# Patient Record
Sex: FEMALE | Race: WHITE | HISPANIC OR LATINO | Employment: UNEMPLOYED | URBAN - METROPOLITAN AREA
[De-identification: names, ages, dates, MRNs, and addresses within clinical notes are randomized per-mention and may not be internally consistent; named-entity substitution may affect disease eponyms.]

---

## 2018-06-30 ENCOUNTER — NURSE TRIAGE (OUTPATIENT)
Dept: ADMINISTRATIVE | Facility: CLINIC | Age: 33
End: 2018-06-30

## 2018-06-30 ENCOUNTER — OFFICE VISIT (OUTPATIENT)
Dept: INTERNAL MEDICINE | Facility: CLINIC | Age: 33
End: 2018-06-30
Payer: COMMERCIAL

## 2018-06-30 VITALS
HEART RATE: 70 BPM | SYSTOLIC BLOOD PRESSURE: 128 MMHG | DIASTOLIC BLOOD PRESSURE: 76 MMHG | WEIGHT: 121.94 LBS | BODY MASS INDEX: 21.61 KG/M2 | TEMPERATURE: 99 F | OXYGEN SATURATION: 99 % | HEIGHT: 63 IN

## 2018-06-30 DIAGNOSIS — F41.9 ANXIETY: ICD-10-CM

## 2018-06-30 DIAGNOSIS — S16.1XXA STRAIN OF NECK MUSCLE, INITIAL ENCOUNTER: ICD-10-CM

## 2018-06-30 DIAGNOSIS — F32.A DEPRESSION, UNSPECIFIED DEPRESSION TYPE: Primary | ICD-10-CM

## 2018-06-30 PROCEDURE — 99203 OFFICE O/P NEW LOW 30 MIN: CPT | Mod: S$GLB,,, | Performed by: INTERNAL MEDICINE

## 2018-06-30 PROCEDURE — 99999 PR PBB SHADOW E&M-EST. PATIENT-LVL III: CPT | Mod: PBBFAC,,, | Performed by: INTERNAL MEDICINE

## 2018-06-30 PROCEDURE — 3008F BODY MASS INDEX DOCD: CPT | Mod: CPTII,S$GLB,, | Performed by: INTERNAL MEDICINE

## 2018-06-30 RX ORDER — CITALOPRAM 20 MG/1
20 TABLET, FILM COATED ORAL DAILY
Qty: 90 TABLET | Refills: 0 | Status: SHIPPED | OUTPATIENT
Start: 2018-06-30 | End: 2018-08-13

## 2018-06-30 RX ORDER — ESCITALOPRAM OXALATE 10 MG/1
10 TABLET ORAL DAILY
Qty: 30 TABLET | Refills: 1 | Status: SHIPPED | OUTPATIENT
Start: 2018-06-30 | End: 2018-06-30

## 2018-06-30 RX ORDER — NAPROXEN 500 MG/1
500 TABLET ORAL 2 TIMES DAILY PRN
Qty: 30 TABLET | Refills: 1 | Status: SHIPPED | OUTPATIENT
Start: 2018-06-30 | End: 2019-07-02

## 2018-06-30 NOTE — TELEPHONE ENCOUNTER
Patient called to report the following:     -patient cannot afford Lexapro  -patient would like to change medication due to cost  -denies urges to harm self       -Provider notified via pc, Dr.Laura Bray    -orders noted for the following   -Citalopram 20mg 1 tab daily dispense # 90 called to [facility]   -no refills     Education completed per [facility] On Call Care Advice including Patient verbalized understating.     Reason for Disposition   Caller has URGENT medication question about med that PCP prescribed and triager unable to answer question    Protocols used: ST MEDICATION QUESTION CALL-A-

## 2018-06-30 NOTE — PATIENT INSTRUCTIONS
Please take one half tablet of Lexapro by mouth daily for 7 days.  Then you may increase to one whole tablet by mouth daily.      Naproxen and naproxen sodium oral immediate-release tablets  What is this medicine?  NAPROXEN (na PROX en) is a non-steroidal anti-inflammatory drug (NSAID). It is used to reduce swelling and to treat pain. This medicine may be used for dental pain, headache, or painful monthly periods. It is also used for painful joint and muscular problems such as arthritis, tendinitis, bursitis, and gout.  How should I use this medicine?  Take this medicine by mouth with a glass of water. Follow the directions on the prescription label. Take it with food if your stomach gets upset. Try to not lie down for at least 10 minutes after you take it. Take your medicine at regular intervals. Do not take your medicine more often than directed. Long-term, continuous use may increase the risk of heart attack or stroke.  A special MedGuide will be given to you by the pharmacist with each prescription and refill. Be sure to read this information carefully each time.  Talk to your pediatrician regarding the use of this medicine in children. Special care may be needed.  What side effects may I notice from receiving this medicine?  Side effects that you should report to your doctor or health care professional as soon as possible:  · black or bloody stools, blood in the urine or vomit  · blurred vision  · chest pain  · difficulty breathing or wheezing  · nausea or vomiting  · severe stomach pain  · skin rash, skin redness, blistering or peeling skin, hives, or itching  · slurred speech or weakness on one side of the body  · swelling of eyelids, throat, lips  · unexplained weight gain or swelling  · unusually weak or tired  · yellowing of eyes or skin  Side effects that usually do not require medical attention (report to your doctor or health care professional if they continue or are  bothersome):  · constipation  · headache  · heartburn  What may interact with this medicine?  · alcohol  · aspirin  · cidofovir  · diuretics  · lithium  · methotrexate  · other drugs for inflammation like ketorolac or prednisone  · pemetrexed  · probenecid  · warfarin  What if I miss a dose?  If you miss a dose, take it as soon as you can. If it is almost time for your next dose, take only that dose. Do not take double or extra doses.  Where should I keep my medicine?  Keep out of the reach of children.  Store at room temperature between 15 and 30 degrees C (59 and 86 degrees F). Keep container tightly closed. Throw away any unused medicine after the expiration date.  What should I tell my health care provider before I take this medicine?  They need to know if you have any of these conditions:  · asthma  · cigarette smoker  · drink more than 3 alcohol containing drinks a day  · heart disease or circulation problems such as heart failure or leg edema (fluid retention)  · high blood pressure  · kidney disease  · liver disease  · stomach bleeding or ulcers  · an unusual or allergic reaction to naproxen, aspirin, other NSAIDs, other medicines, foods, dyes, or preservatives  · pregnant or trying to get pregnant  · breast-feeding  What should I watch for while using this medicine?  Tell your doctor or health care professional if your pain does not get better. Talk to your doctor before taking another medicine for pain. Do not treat yourself.  This medicine does not prevent heart attack or stroke. In fact, this medicine may increase the chance of a heart attack or stroke. The chance may increase with longer use of this medicine and in people who have heart disease. If you take aspirin to prevent heart attack or stroke, talk with your doctor or health care professional.  Do not take other medicines that contain aspirin, ibuprofen, or naproxen with this medicine. Side effects such as stomach upset, nausea, or ulcers may be  more likely to occur. Many medicines available without a prescription should not be taken with this medicine.  This medicine can cause ulcers and bleeding in the stomach and intestines at any time during treatment. Do not smoke cigarettes or drink alcohol. These increase irritation to your stomach and can make it more susceptible to damage from this medicine. Ulcers and bleeding can happen without warning symptoms and can cause death.  You may get drowsy or dizzy. Do not drive, use machinery, or do anything that needs mental alertness until you know how this medicine affects you. Do not stand or sit up quickly, especially if you are an older patient. This reduces the risk of dizzy or fainting spells.  This medicine can cause you to bleed more easily. Try to avoid damage to your teeth and gums when you brush or floss your teeth.  NOTE:This sheet is a summary. It may not cover all possible information. If you have questions about this medicine, talk to your doctor, pharmacist, or health care provider. Copyright© 2017 [employer]        Escitalopram tablets  What is this medicine?  ESCITALOPRAM (es sye BRAXTON oh pram) is used to treat depression and certain types of anxiety.  How should I use this medicine?  Take this medicine by mouth with a glass of water. Follow the directions on the prescription label. You can take it with or without food. If it upsets your stomach, take it with food. Take your medicine at regular intervals. Do not take it more often than directed. Do not stop taking this medicine suddenly except upon the advice of your doctor. Stopping this medicine too quickly may cause serious side effects or your condition may worsen.  A special MedGuide will be given to you by the pharmacist with each prescription and refill. Be sure to read this information carefully each time.  Talk to your pediatrician regarding the use of this medicine in children. Special care may be needed.  What side effects may I notice  from receiving this medicine?  Side effects that you should report to your doctor or health care professional as soon as possible:  · allergic reactions like skin rash, itching or hives, swelling of the face, lips, or tongue  · confusion  · feeling faint or lightheaded, falls  · fast talking and excited feelings or actions that are out of control  · hallucination, loss of contact with reality  · seizures  · suicidal thoughts or other mood changes  · unusual bleeding or bruising  Side effects that usually do not require medical attention (report to your doctor or health care professional if they continue or are bothersome):  · blurred vision  · changes in appetite  · change in sex drive or performance  · headache  · increased sweating  · nausea  What may interact with this medicine?  Do not take this medicine with any of the following medications:  · certain medicines for fungal infections like fluconazole, itraconazole, ketoconazole, posaconazole, voriconazole  · cisapride  · citalopram  · dofetilide  · dronedarone  · linezolid  · MAOIs like Carbex, Eldepryl, Marplan, Nardil, and Parnate  · methylene blue (injected into a vein)  · pimozide  · thioridazine  · ziprasidone  This medicine may also interact with the following medications:  · alcohol  · aspirin and aspirin-like medicines  · carbamazepine  · certain medicines for depression, anxiety, or psychotic disturbances  · certain medicines for migraine headache like almotriptan, eletriptan, frovatriptan, naratriptan, rizatriptan, sumatriptan, zolmitriptan  · certain medicines for sleep  · certain medicines that treat or prevent blood clots like warfarin, enoxaparin, dalteparin  · cimetidine  · diuretics  · fentanyl  · furazolidone  · isoniazid  · lithium  · metoprolol  · NSAIDs, medicines for pain and inflammation, like ibuprofen or naproxen  · other medicines that prolong the QT interval (cause an abnormal heart rhythm)  · procarbazine  · rasagiline  · supplements  like Charisse's wort, kava kava, valerian  · tramadol  · tryptophan  What if I miss a dose?  If you miss a dose, take it as soon as you can. If it is almost time for your next dose, take only that dose. Do not take double or extra doses.  Where should I keep my medicine?  Keep out of reach of children.  Store at room temperature between 15 and 30 degrees C (59 and 86 degrees F). Throw away any unused medicine after the expiration date.  What should I tell my health care provider before I take this medicine?  They need to know if you have any of these conditions:  · bipolar disorder or a family history of bipolar disorder  · diabetes  · glaucoma  · heart disease  · kidney or liver disease  · receiving electroconvulsive therapy  · seizures (convulsions)  · suicidal thoughts, plans, or attempt by you or a family member  · an unusual or allergic reaction to escitalopram, the related drug citalopram, other medicines, foods, dyes, or preservatives  · pregnant or trying to become pregnant  · breast-feeding  What should I watch for while using this medicine?  Tell your doctor if your symptoms do not get better or if they get worse. Visit your doctor or health care professional for regular checks on your progress. Because it may take several weeks to see the full effects of this medicine, it is important to continue your treatment as prescribed by your doctor.  Patients and their families should watch out for new or worsening thoughts of suicide or depression. Also watch out for sudden changes in feelings such as feeling anxious, agitated, panicky, irritable, hostile, aggressive, impulsive, severely restless, overly excited and hyperactive, or not being able to sleep. If this happens, especially at the beginning of treatment or after a change in dose, call your health care professional.  You may get drowsy or dizzy. Do not drive, use machinery, or do anything that needs mental alertness until you know how this medicine  affects you. Do not stand or sit up quickly, especially if you are an older patient. This reduces the risk of dizzy or fainting spells. Alcohol may interfere with the effect of this medicine. Avoid alcoholic drinks.  Your mouth may get dry. Chewing sugarless gum or sucking hard candy, and drinking plenty of water may help. Contact your doctor if the problem does not go away or is severe.  NOTE:This sheet is a summary. It may not cover all possible information. If you have questions about this medicine, talk to your doctor, pharmacist, or health care provider. Copyright© 2017 [employer]

## 2018-06-30 NOTE — PROGRESS NOTES
"Subjective:       Patient ID: Deirdre Prado is a 33 y.o. female.    Chief Complaint: Headache (symptoms started about a week ago); Nausea; and Fatigue    HPI Mrs. Prado is a 33 year old female who presents with multiple chief complaints today including headache, fatigue, nausea, and arm pain.    The patient states that she has had some headache on the right side of her head.  She also has pain down the right side of her neck and into the right shoulder.  She has a tingling or numbness sensation of the right arm.  She also has numbness in one of her toes.  She has had a recent cramping of her fingers.  She says that she cannot sleep.  She has not slept in about 7 days.  Her 3-year-old keeps her up at night, but she does not think this is the issue.  She feels hungry but when she tries to eat something, she feels like she needs to vomit and she will feel dizzy.  She states that she "doesn't want to do anything" around the house.  The relationship with her  and her stepdaughter has been strained.  Her stepdaughter is 18 years old and lives in the home with the patient does not speak to the patient.  She has not spoken to her in 2 years.  She states that there is a lot of stress in the home.  She feels anxious and depressed, but denies any suicidal ideation.  She has been taking Tylenol and Advil migraine for relief of her pain symptoms, but these are not helping.  She has a history of postpartum depression, but states that this was never treated.  When her  was told of the diagnosis of postpartum depression, he laughed and said that this did not exist.  She has never been treated for anxiety or depression in the past.  She has recently lost about 15 pounds but states that this was  intentional.    Review of Systems   Constitutional: Negative for unexpected weight change.   Gastrointestinal: Positive for nausea. Negative for vomiting.   Musculoskeletal: Positive for arthralgias, myalgias and neck " pain.   Neurological: Positive for dizziness.   Psychiatric/Behavioral: Positive for dysphoric mood and sleep disturbance. Negative for suicidal ideas. The patient is nervous/anxious.        Objective:      Physical Exam   Constitutional: She is oriented to person, place, and time. She appears well-developed and well-nourished. No distress.   HENT:   Head: Normocephalic and atraumatic.   Right Ear: External ear normal.   Left Ear: External ear normal.   Nose: Nose normal.   Eyes: Conjunctivae and EOM are normal.   Neck:   Some tightness noted of right side cervical muscles   Cardiovascular: Normal rate, regular rhythm, normal heart sounds and intact distal pulses.  Exam reveals no gallop and no friction rub.    No murmur heard.  Pulmonary/Chest: Effort normal and breath sounds normal. No respiratory distress. She has no wheezes. She has no rales.   Neurological: She is alert and oriented to person, place, and time.   Skin: Skin is warm and dry. She is not diaphoretic.   Psychiatric: Her speech is normal and behavior is normal. Judgment and thought content normal. Cognition and memory are normal. She exhibits a depressed mood.   Tearful at times   Vitals reviewed.      Assessment:       1. Depression, unspecified depression type    2. Anxiety    3. Strain of neck muscle, initial encounter        Plan:     #1 anxiety and depression  Starting patient on Lexapro 10 mg by mouth daily.  Patient will take half a tablet by mouth daily ×7 days, and then she will increase to one whole tablet daily.  Recommend counseling in conjunction with the medication.  Return to clinic in 6 weeks for follow-up    #3 right sided cervical muscle strain  Naproxen 500 mg by mouth twice a day  Apply heat to the site as needed for pain relief.  Recommend massage and continue movement of the site.    RTC 6 weeks

## 2018-08-13 ENCOUNTER — OFFICE VISIT (OUTPATIENT)
Dept: INTERNAL MEDICINE | Facility: CLINIC | Age: 33
End: 2018-08-13
Payer: COMMERCIAL

## 2018-08-13 VITALS
WEIGHT: 128.06 LBS | TEMPERATURE: 99 F | DIASTOLIC BLOOD PRESSURE: 72 MMHG | BODY MASS INDEX: 22.69 KG/M2 | SYSTOLIC BLOOD PRESSURE: 110 MMHG | HEART RATE: 80 BPM | HEIGHT: 63 IN | OXYGEN SATURATION: 98 %

## 2018-08-13 DIAGNOSIS — F41.9 ANXIETY: ICD-10-CM

## 2018-08-13 DIAGNOSIS — F32.A DEPRESSION, UNSPECIFIED DEPRESSION TYPE: Primary | ICD-10-CM

## 2018-08-13 DIAGNOSIS — G43.709 CHRONIC MIGRAINE WITHOUT AURA WITHOUT STATUS MIGRAINOSUS, NOT INTRACTABLE: ICD-10-CM

## 2018-08-13 PROCEDURE — 3008F BODY MASS INDEX DOCD: CPT | Mod: CPTII,S$GLB,, | Performed by: INTERNAL MEDICINE

## 2018-08-13 PROCEDURE — 99999 PR PBB SHADOW E&M-EST. PATIENT-LVL III: CPT | Mod: PBBFAC,,, | Performed by: INTERNAL MEDICINE

## 2018-08-13 PROCEDURE — 99213 OFFICE O/P EST LOW 20 MIN: CPT | Mod: S$GLB,,, | Performed by: INTERNAL MEDICINE

## 2018-08-13 RX ORDER — BUPROPION HYDROCHLORIDE 100 MG/1
100 TABLET ORAL 2 TIMES DAILY
Qty: 60 TABLET | Refills: 3 | Status: SHIPPED | OUTPATIENT
Start: 2018-08-13 | End: 2019-07-02

## 2018-08-13 NOTE — PROGRESS NOTES
Subjective:       Patient ID: Deirdre Prado is a 33 y.o. female.    Chief Complaint: Depression and Anxiety    HPI Mrs. Prado is a 33 year old female with anxiety and depression who presents for follow-up of anxiety and depression.  Patient states that her feelings of anxiety and depression are much improved.  She denies any suicidal ideation as well.  However she reports that if she does not take the medicine that she will have a headache.  She already has a history of migraines and reports that this is worsening her migraines.  She will take the medication as she is supposed to every day, but she continued to have a headache for couple of hours afterwards.  She has also noticed a decrease in her sexual feelings and this is causing difficulty with her marriage.  She is currently on Celexa.  The Lexapro that was initially prescribed was too expensive and was changed to Celexa.  She has also noticed that she sleeps a lot on the current medication.    Review of Systems   Neurological: Positive for headaches.   Psychiatric/Behavioral: Positive for dysphoric mood (improved). Negative for suicidal ideas. The patient is nervous/anxious (improved).        Objective:      Physical Exam   Constitutional: She is oriented to person, place, and time. She appears well-developed and well-nourished. No distress.   HENT:   Head: Normocephalic and atraumatic.   Eyes: Conjunctivae and EOM are normal.   Neurological: She is alert and oriented to person, place, and time.   Skin: Skin is warm and dry. She is not diaphoretic.   Psychiatric: She has a normal mood and affect. Her behavior is normal. Judgment and thought content normal.   Vitals reviewed.      Assessment:       1. Depression, unspecified depression type    2. Anxiety    3. Chronic migraine without aura without status migrainosus, not intractable        Plan:     1.  Anxiety and depression  GAD7 score 3; PHQ9 score 5 (will be scanned into the record)  Changing patient  from Celexa to Wellbutrin in an effort to try to avoid some of the side effects that she has mentioned above.  Patient counseled to contact me for any side effects or worsening symptoms.  Return to clinic in 2 months for follow-up    2. Chronic migraine  Instructed patient that if she is still having headaches despite changing to Wellbutrin, she should follow up with her neurologist.    RTC 2 months

## 2018-10-12 ENCOUNTER — TELEPHONE (OUTPATIENT)
Dept: INTERNAL MEDICINE | Facility: CLINIC | Age: 33
End: 2018-10-12

## 2018-11-08 ENCOUNTER — OFFICE VISIT (OUTPATIENT)
Dept: INTERNAL MEDICINE | Facility: CLINIC | Age: 33
End: 2018-11-08
Payer: COMMERCIAL

## 2018-11-08 VITALS
HEIGHT: 63 IN | WEIGHT: 128.75 LBS | TEMPERATURE: 99 F | BODY MASS INDEX: 22.81 KG/M2 | OXYGEN SATURATION: 99 % | DIASTOLIC BLOOD PRESSURE: 68 MMHG | SYSTOLIC BLOOD PRESSURE: 102 MMHG | HEART RATE: 89 BPM

## 2018-11-08 DIAGNOSIS — B37.31 YEAST INFECTION OF THE VAGINA: Primary | ICD-10-CM

## 2018-11-08 PROCEDURE — 99214 OFFICE O/P EST MOD 30 MIN: CPT | Mod: S$GLB,,, | Performed by: INTERNAL MEDICINE

## 2018-11-08 PROCEDURE — 99999 PR PBB SHADOW E&M-EST. PATIENT-LVL III: CPT | Mod: PBBFAC,,, | Performed by: INTERNAL MEDICINE

## 2018-11-08 PROCEDURE — 3008F BODY MASS INDEX DOCD: CPT | Mod: CPTII,S$GLB,, | Performed by: INTERNAL MEDICINE

## 2018-11-08 RX ORDER — ASPIRIN 325 MG
1 TABLET, DELAYED RELEASE (ENTERIC COATED) ORAL NIGHTLY
Qty: 7 TUBE | Refills: 1 | Status: SHIPPED | OUTPATIENT
Start: 2018-11-08 | End: 2018-11-15

## 2018-11-08 RX ORDER — FLUCONAZOLE 150 MG/1
150 TABLET ORAL DAILY
Qty: 1 TABLET | Refills: 0 | Status: SHIPPED | OUTPATIENT
Start: 2018-11-08 | End: 2018-11-09

## 2018-11-08 NOTE — PATIENT INSTRUCTIONS
Clotrimazole vaginal cream  What is this medicine?  CLOTRIMAZOLE (kloe TRIM a zole) is an antifungal medicine. It is used to treat yeast infections of the vagina.  How should I use this medicine?  This medicine is for use in the vagina. It may also be applied to the external areas of skin around the vagina to decrease itching and discomfort. Do not take by mouth. Wash hands before and after use. Read package directions carefully before using. Do not use your medicine more often than directed. Do not stop using this medicine except on your doctor's advice.  Talk to your pediatrician regarding the use of this medicine in children. While this medicine may be used in girls as young as 12 years for selected conditions, precautions do apply.  What side effects may I notice from receiving this medicine?  Side effects that you should report to your doctor or health care professional as soon as possible:  · allergic reactions like skin rash, itching or hives, swelling of the face, lips, or tongue  · pain or trouble passing urine  · vaginal pain  Side effects that usually do not require medical attention (report to your doctor or health care professional if they continue or are bothersome):  · vaginal irritation, itching or burning  What may interact with this medicine?  · spermicides  Do not use any other vaginal products without telling your doctor or health care professional.  What if I miss a dose?  If you miss a dose, use it as soon as you can. If it is almost time for your next dose, use only that dose. Do not use double or extra doses.  Where should I keep my medicine?  Keep out of the reach of children.  Store at room temperature below 30 degrees C (86 degrees F). Do not freeze. Throw away any unused medicine after the expiration date.  What should I tell my health care provider before I take this medicine?  They need to know if you have any of these conditions:  · diabetes  · frequent infections  · HIV or  AIDS  · an unusual or allergic reaction to clotrimazole, other medicines, foods, dyes or preservatives  · pregnant or trying to get pregnant  · breast-feeding  What should I watch for while using this medicine?  Tell your doctor or health care professional if your symptoms do not start to get better within a few days.  It is better not to have sex until you have finished your treatment. This medicine may damage condoms or diaphragms and cause them not to work properly. It may also decrease the effect of vaginal spermicides. Do not rely on any of these methods to prevent sexually transmitted diseases or pregnancy while you are using this medicine.  Vaginal medicines usually will come out of the vagina during treatment. To keep the medicine from getting on your clothing, wear a mini-pad or sanitary napkin. The use of tampons is not recommended since they may soak up the medicine. To help clear up the infection, wear freshly washed cotton, not synthetic, underwear.  NOTE:This sheet is a summary. It may not cover all possible information. If you have questions about this medicine, talk to your doctor, pharmacist, or health care provider. Copyright© 2017 [employer]        Fluconazole tablets  What is this medicine?  FLUCONAZOLE (floo TANYA na zole) is an antifungal medicine. It is used to treat certain kinds of fungal or yeast infections.  How should I use this medicine?  Take this medicine by mouth. Follow the directions on the prescription label. Do not take your medicine more often than directed.  Talk to your pediatrician regarding the use of this medicine in children. Special care may be needed. This medicine has been used in children as young as 6 months of age.  What side effects may I notice from receiving this medicine?  Side effects that you should report to your doctor or health care professional as soon as possible:  · allergic reactions like skin rash or itching, hives, swelling of the lips, mouth, tongue,  or throat  · dark urine  · feeling dizzy or faint  · irregular heartbeat or chest pain  · redness, blistering, peeling or loosening of the skin, including inside the mouth  · trouble breathing  · unusual bruising or bleeding  · vomiting  · yellowing of the eyes or skin  Side effects that usually do not require medical attention (report to your doctor or health care professional if they continue or are bothersome):  · changes in how food tastes  · diarrhea  · headache  · stomach upset or nausea  What may interact with this medicine?  Do not take this medicine with any of the following medications:  · astemizole  · certain medicines for irregular heart beat like dofetilide, dronedarone, quinidine  · cisapride  · erythromycin  · lomitapide  · other medicines that prolong the QT interval (cause an abnormal heart rhythm)  · pimozide  · terfenadine  · thioridazine  · tolvaptan  · ziprasidone  This medicine may also interact with the following medications:  · antiviral medicines for HIV or AIDS  · birth control pills  · certain antibiotics like rifabutin, rifampin  · certain medicines for blood pressure like amlodipine, isradipine, felodipine, hydrochlorothiazide, losartan, nifedipine  · certain medicines for cancer like cyclophosphamide, vinblastine, vincristine  · certain medicines for cholesterol like atorvastatin, lovastatin, fluvastatin, simvastatin  · certain medicines for depression, anxiety, or psychotic disturbances like amitriptyline, midazolam, nortriptyline, triazolam  · certain medicines for diabetes like glipizide, glyburide, tolbutamide  · certain medicines for pain like alfentanil, fentanyl, methadone  · certain medicines for seizures like carbamazepine, phenytoin  · certain medicines that treat or prevent blood clots like warfarin  · halofantrine  · medicines that lower your chance of fighting infection like cyclosporine, prednisone, tacrolimus  · NSAIDS, medicines for pain and inflammation, like  celecoxib, diclofenac, flurbiprofen, ibuprofen, meloxicam, naproxen  · other medicines for fungal infections  · sirolimus  · theophylline  · tofacitinib  What if I miss a dose?  If you miss a dose, take it as soon as you can. If it is almost time for your next dose, take only that dose. Do not take double or extra doses.  Where should I keep my medicine?  Keep out of the reach of children.  Store at room temperature below 30 degrees C (86 degrees F). Throw away any medicine after the expiration date.  What should I tell my health care provider before I take this medicine?  They need to know if you have any of these conditions:  · electrolyte abnormalities  · history of irregular heart beat  · kidney disease  · an unusual or allergic reaction to fluconazole, other azole antifungals, medicines, foods, dyes, or preservatives  · pregnant or trying to get pregnant  · breast-feeding  What should I watch for while using this medicine?  Visit your doctor or health care professional for regular checkups. If you are taking this medicine for a long time you may need blood work. Tell your doctor if your symptoms do not improve. Some fungal infections need many weeks or months of treatment to cure.  Alcohol can increase possible damage to your liver. Avoid alcoholic drinks.  If you have a vaginal infection, do not have sex until you have finished your treatment. You can wear a sanitary napkin. Do not use tampons. Wear freshly washed cotton, not synthetic, panties.  NOTE:This sheet is a summary. It may not cover all possible information. If you have questions about this medicine, talk to your doctor, pharmacist, or health care provider. Copyright© 2017 [employer]

## 2018-11-08 NOTE — PROGRESS NOTES
Subjective:       Patient ID: Deirdre Prado is a 33 y.o. female.    Chief Complaint: Cyst (x4d)    HPI Mrs. Prado is a 33 year old female who presents with a chief complaint of vaginal pain. Patient states that she has a sore or a bump the right side of her vulva.  It has been present for 4 days.  She is unsure if it is getting larger in size but she does note that it is getting worse because it is becoming more painful.  Her pain is currently 8/10 in severity.  She had this before.  Sometime in the last few months.  She used cortisone and Desitin and it got better.  She has had some green drainage from the site.  No associated fevers.  Not currently using any medications on the site. It is constant.     Review of Systems   Constitutional: Negative for fever.   Genitourinary: Positive for vaginal pain.       Objective:      Physical Exam   Constitutional: She is oriented to person, place, and time. She appears well-developed and well-nourished. No distress.   HENT:   Head: Normocephalic and atraumatic.   Eyes: Conjunctivae and EOM are normal.   Genitourinary:         Genitourinary Comments: Linear area of erythema as highlighted on the diagram.  Copious white vaginal discharge noted.  White exudate also noted in labial folds.   Musculoskeletal: She exhibits no edema or deformity.   Neurological: She is alert and oriented to person, place, and time.   Skin: Skin is warm and dry. She is not diaphoretic.   Psychiatric: She has a normal mood and affect. Her behavior is normal. Judgment and thought content normal.   Vitals reviewed.      Assessment:       1. Yeast infection of the vagina        Plan:     1. Yeast infection of vagina  Fluconazole 150 mg POX 1 dose  clotrimazole cream applied nightly x7 days  If no relief with above, consider herpes on differential given the linear erythematous lesion.    RTC PRN

## 2019-07-02 ENCOUNTER — OFFICE VISIT (OUTPATIENT)
Dept: OBSTETRICS AND GYNECOLOGY | Facility: CLINIC | Age: 34
End: 2019-07-02
Payer: COMMERCIAL

## 2019-07-02 VITALS
SYSTOLIC BLOOD PRESSURE: 112 MMHG | HEIGHT: 63 IN | DIASTOLIC BLOOD PRESSURE: 64 MMHG | WEIGHT: 125.25 LBS | BODY MASS INDEX: 22.19 KG/M2

## 2019-07-02 DIAGNOSIS — Z01.419 ENCOUNTER FOR WELL WOMAN EXAM WITH ROUTINE GYNECOLOGICAL EXAM: Primary | ICD-10-CM

## 2019-07-02 DIAGNOSIS — Z12.31 ENCOUNTER FOR SCREENING MAMMOGRAM FOR BREAST CANCER: ICD-10-CM

## 2019-07-02 PROCEDURE — 99385 PREV VISIT NEW AGE 18-39: CPT | Mod: S$GLB,,, | Performed by: OBSTETRICS & GYNECOLOGY

## 2019-07-02 PROCEDURE — 99385 PR PREVENTIVE VISIT,NEW,18-39: ICD-10-PCS | Mod: S$GLB,,, | Performed by: OBSTETRICS & GYNECOLOGY

## 2019-07-02 PROCEDURE — 99999 PR PBB SHADOW E&M-EST. PATIENT-LVL III: CPT | Mod: PBBFAC,,, | Performed by: OBSTETRICS & GYNECOLOGY

## 2019-07-02 PROCEDURE — 88175 CYTOPATH C/V AUTO FLUID REDO: CPT

## 2019-07-02 PROCEDURE — 99999 PR PBB SHADOW E&M-EST. PATIENT-LVL III: ICD-10-PCS | Mod: PBBFAC,,, | Performed by: OBSTETRICS & GYNECOLOGY

## 2019-07-06 NOTE — PROGRESS NOTES
"GYNECOLOGY  :  Deirdre Prado is a 34 y.o.    Here today for  gyn annual visit     No gyn  complaints  Normal menstrual cycles   No Hx Abnormal PAP smears  No Hx Abnormal Mammogram       Past Medical History:   Diagnosis Date    Breast disorder     Mental disorder      Past Surgical History:   Procedure Laterality Date    TUBAL LIGATION       Family History   Problem Relation Age of Onset    Cancer Mother     No Known Problems Father     Breast cancer Maternal Grandmother      Social History     Tobacco Use    Smoking status: Former Smoker    Smokeless tobacco: Never Used   Substance Use Topics    Alcohol use: Yes     Frequency: Never     Binge frequency: Never    Drug use: No     OB History    Para Term  AB Living   2 2 2     2   SAB TAB Ectopic Multiple Live Births           2      # Outcome Date GA Lbr Louie/2nd Weight Sex Delivery Anes PTL Lv   2 Term 14    F Vag-Spont   OLIVIER   1 Term 11    F Vag-Spont   OLIVIER       /64 (BP Location: Right arm)   Ht 5' 3" (1.6 m)   Wt 56.8 kg (125 lb 3.5 oz)   LMP 2019   BMI 22.18 kg/m²     Last PAP= 4- 5 years   LMP =6/3/19   Last Mammogram = n/a  Last Colonoscopy  =n/a      COMPREHENSIVE GYN HISTORY:  G 2  P2     PAP History: Denies abnormal Paps.  Infection History: Denies STDs. Denies PID.  Benign History: Denies uterine fibroids. Denies ovarian cysts. Denies endometriosis.   Cancer History: Denies cervical cancer. Denies uterine cancer or hyperplasia. Denies ovarian cancer. Denies vulvar cancer or pre-cancer. Denies vaginal cancer or pre-cancer. Denies breast cancer. Denies colon cancer.  Sexual Activity History: ( x ) Yes   (  )   no   Menstrual History: Age of menarche: ( 13  )  years. Every (30   )       days, flows for (  4 )   days. moderate  flow.  Dysmenorrhea History:  absent  Contraception:   BTL in 2016     ROS  GENERAL: Denies significant weight gain or weight loss. Feeling well overall.  SKIN: Denies rash " or lesions.  Normal skin turgor  HEAD: Denies head injury or headache.   NODES: Denies enlarged lymph nodes.   CHEST: Denies chest pain or shortness of breath.   CARDIOVASCULAR: Denies palpitations or left sided chest pain.   ABDOMEN: No abdominal pain,no  diarrhea,constipation  nausea, vomiting or rectal bleeding.   URINARY: normal  Frequency,no  Dysuria or burning on urination.   REPRODUCTIVE: Per HPI   BREASTS: The patient sometimes performs breast self-examination and denies pain, lumps, or nipple discharge.   HEMATOLOGIC: No easy bruisability or excessive bleeding.   MUSCULOSKELETAL: Denies joint pain or swelling.   NEUROLOGIC: Denies syncope or weakness.   PSYCHIATRIC: Denies depression, anxiety or mood swings.    Physical Exam     Constitutional: She is oriented to person, place, and time. She appears well-developed and well-nourished. No distress.   HENT:   Head: Normocephalic.   NECK: Neck symmetric without masses or thyromegaly.  Cardiovascular: Normal rate and regular rhythm.   Pulmonary/Chest: Effort normal and breath sounds normal. No respiratory distress. She has no wheezes.   Breasts: Symmetrical, no skin changes or visible lesions. No palpable masses, nipple discharge or adenopathy bilaterally.  Abdominal: Soft not distended. Bowel sounds are normal. She exhibits   no masses . No tenderness to palpation.   Genitourinary: Pelvic exam was performed with patient supine.   External genitalia normal no lesions.Normal hair distribution   Adequate perineal body,normal urethral meatus   Vagina moist and well rugated without lesions, no vaginal  Discharge.   Cervix pink and without lesions. No bleeding. No significant cystocele or rectocele.  Bimanual exam showed uterus normal size, shape and position , mobile and nontender. Adnexa without masses or tenderness. Urethra and bladder normal  Extremities normal no cyanosis ,edema.     Procedures:  Pap smear    A/P Deirdre Prado 34 y.o.     Dx=  1-  routine gyn exam   2- cervical cancer screening   3-    Plan:  Routine gyn   -s/p normal breast exam   -s/p normal pelvic exam:    Patient was counseled today on A.C.S. Pap guidelines and recommendations for yearly pelvic exams, mammograms and monthly self breast exams; to see her PCP for other health maintenance, nutrition and weight gain counseling, discussed lab values.  Discussed colonoscopy recommendations every 10 years starting at age 50   Calcium and vit D daily intake     F/u in 1 yr or MAYLINN    Yayo Zamarripa M.D.   OB/GYN

## 2019-10-09 ENCOUNTER — PATIENT OUTREACH (OUTPATIENT)
Dept: ADMINISTRATIVE | Facility: OTHER | Age: 34
End: 2019-10-09

## 2019-10-10 ENCOUNTER — OFFICE VISIT (OUTPATIENT)
Dept: OBSTETRICS AND GYNECOLOGY | Facility: CLINIC | Age: 34
End: 2019-10-10
Payer: COMMERCIAL

## 2019-10-10 VITALS
HEIGHT: 63 IN | BODY MASS INDEX: 21.99 KG/M2 | WEIGHT: 124.13 LBS | SYSTOLIC BLOOD PRESSURE: 104 MMHG | DIASTOLIC BLOOD PRESSURE: 62 MMHG

## 2019-10-10 DIAGNOSIS — R10.2 PELVIC PAIN: Primary | ICD-10-CM

## 2019-10-10 DIAGNOSIS — N89.8 VAGINAL DISCHARGE: ICD-10-CM

## 2019-10-10 PROCEDURE — 99213 PR OFFICE/OUTPT VISIT, EST, LEVL III, 20-29 MIN: ICD-10-PCS | Mod: S$GLB,,, | Performed by: OBSTETRICS & GYNECOLOGY

## 2019-10-10 PROCEDURE — 87801 DETECT AGNT MULT DNA AMPLI: CPT

## 2019-10-10 PROCEDURE — 87481 CANDIDA DNA AMP PROBE: CPT | Mod: 59

## 2019-10-10 PROCEDURE — 3008F BODY MASS INDEX DOCD: CPT | Mod: CPTII,S$GLB,, | Performed by: OBSTETRICS & GYNECOLOGY

## 2019-10-10 PROCEDURE — 99999 PR PBB SHADOW E&M-EST. PATIENT-LVL III: CPT | Mod: PBBFAC,,, | Performed by: OBSTETRICS & GYNECOLOGY

## 2019-10-10 PROCEDURE — 99999 PR PBB SHADOW E&M-EST. PATIENT-LVL III: ICD-10-PCS | Mod: PBBFAC,,, | Performed by: OBSTETRICS & GYNECOLOGY

## 2019-10-10 PROCEDURE — 99213 OFFICE O/P EST LOW 20 MIN: CPT | Mod: S$GLB,,, | Performed by: OBSTETRICS & GYNECOLOGY

## 2019-10-10 PROCEDURE — 87086 URINE CULTURE/COLONY COUNT: CPT

## 2019-10-10 PROCEDURE — 3008F PR BODY MASS INDEX (BMI) DOCUMENTED: ICD-10-PCS | Mod: CPTII,S$GLB,, | Performed by: OBSTETRICS & GYNECOLOGY

## 2019-10-10 RX ORDER — CIPROFLOXACIN 250 MG/1
250 TABLET, FILM COATED ORAL EVERY 12 HOURS
Qty: 6 TABLET | Refills: 0 | Status: SHIPPED | OUTPATIENT
Start: 2019-10-10 | End: 2019-10-13

## 2019-10-10 NOTE — PROGRESS NOTES
"GYNECOLOGY  :  Deirdre Prado is a 34 y.o.    Here today for  gyn evaluation    3-4 days of suprapubic pain associated with burning and dark urine   Also last month bled  Twice, second episode was dark and with 'odor'   But is concerned mostly about the bleeding   Not bleeding now, sand was only one episode last month       Past Medical History:   Diagnosis Date    Breast disorder     Mental disorder      Past Surgical History:   Procedure Laterality Date    TUBAL LIGATION       Family History   Problem Relation Age of Onset    Cancer Mother     No Known Problems Father     Breast cancer Maternal Grandmother      Social History     Tobacco Use    Smoking status: Former Smoker    Smokeless tobacco: Never Used   Substance Use Topics    Alcohol use: Yes     Frequency: Never     Binge frequency: Never    Drug use: No     OB History    Para Term  AB Living   2 2 2     2   SAB TAB Ectopic Multiple Live Births           2      # Outcome Date GA Lbr Louie/2nd Weight Sex Delivery Anes PTL Lv   2 Term 14    F Vag-Spont   OLIVIER   1 Term 11    F Vag-Spont   OLIVIER       /62 (BP Location: Right arm)   Ht 5' 3" (1.6 m)   Wt 56.3 kg (124 lb 1.9 oz)   LMP 2019   BMI 21.99 kg/m²     ROS  GENERAL: Denies significant weight gain or weight loss. Feeling well overall.  SKIN: Denies rash or lesions.  Normal skin turgor  HEAD: Denies head injury or headache.   NODES: Denies enlarged lymph nodes.   CHEST: Denies chest pain or shortness of breath.   CARDIOVASCULAR: Denies palpitations or left sided chest pain.   ABDOMEN: No abdominal pain,no  diarrhea,constipation  nausea, vomiting or rectal bleeding.   URINARY: normal  Frequency,no  Dysuria or burning on urination.   REPRODUCTIVE: Per HPI   BREASTS: The patient sometimes performs breast self-examination and denies pain, lumps, or nipple discharge.   HEMATOLOGIC: No easy bruisability or excessive bleeding.   MUSCULOSKELETAL: Denies " joint pain or swelling.   NEUROLOGIC: Denies syncope or weakness.   PSYCHIATRIC: Denies depression, anxiety or mood swings.    Physical Exam     Constitutional: She is oriented to person, place, and time. She appears well-developed and well-nourished. No distress.   HENT:   Head: Normocephalic.   NECK: Neck symmetric without masses or thyromegaly.  Cardiovascular: Normal rate and regular rhythm.   Pulmonary/Chest: Effort normal and breath sounds normal. No respiratory distress. She has no wheezes.   Breasts: Symmetrical, no skin changes or visible lesions. No palpable masses, nipple discharge or adenopathy bilaterally.  Abdominal: Soft not distended. Bowel sounds are normal. She exhibits   no masses . Suprapubic  tenderness to palpation.   Genitourinary: Pelvic exam was performed with patient supine.   External genitalia normal no lesions.Normal hair distribution   Adequate perineal body,normal urethral meatus   Vagina moist and well rugated without lesions, no vaginal  Discharge.   Cervix pink and without lesions. No bleeding. No significant cystocele or rectocele.  Bimanual exam showed uterus normal size, shape and position , mobile and nontender. Adnexa without masses or tenderness. Urethra and bladder normal  Extremities normal no cyanosis ,edema.     Procedures:  Urine culture   Urine dip ++ gloria ++ leuk     A/P Deirdre Prado 34 y.o.     Dx=  1- pelvic pain    Dysuria    2- UTI   3-    Plan:  Cipro x UTI  Will review culture   If continues with irregular bleeding will RTC  For TVUS       Patient was counseled today on A.C.S. Pap guidelines and recommendations for yearly pelvic exams, mammograms and monthly self breast exams; to see her PCP for other health maintenance, nutrition and weight gain counseling, discussed lab values.  Discussed colonoscopy recommendations every 10 years starting at age 50   Calcium and vit D daily intake     F/u in 1 yr or PRN    Yayo Zamarripa M.D.   OB/GYN

## 2019-10-11 LAB
BACTERIAL VAGINOSIS DNA: POSITIVE
CANDIDA GLABRATA DNA: NEGATIVE
CANDIDA KRUSEI DNA: NEGATIVE
CANDIDA RRNA VAG QL PROBE: NEGATIVE
T VAGINALIS RRNA GENITAL QL PROBE: NEGATIVE

## 2019-10-12 LAB — BACTERIA UR CULT: NO GROWTH

## 2019-10-15 ENCOUNTER — TELEPHONE (OUTPATIENT)
Dept: OBSTETRICS AND GYNECOLOGY | Facility: CLINIC | Age: 34
End: 2019-10-15

## 2019-10-15 RX ORDER — METRONIDAZOLE 500 MG/1
500 TABLET ORAL EVERY 12 HOURS
Qty: 14 TABLET | Refills: 0 | Status: SHIPPED | OUTPATIENT
Start: 2019-10-15 | End: 2019-10-22

## 2019-10-15 NOTE — TELEPHONE ENCOUNTER
Affirm resulted  Positive for BV  Rx for flagyl sent to pharmacy on file  Please inform patient    Yayo Zamarripa M.D.   OB/GYN

## 2019-10-16 ENCOUNTER — TELEPHONE (OUTPATIENT)
Dept: OBSTETRICS AND GYNECOLOGY | Facility: CLINIC | Age: 34
End: 2019-10-16

## 2019-10-16 NOTE — TELEPHONE ENCOUNTER
Called pt-  answered the call and will inform  Pt to contact us. He states he picked up a medication and pt started taking it already but he will have her call us

## 2019-10-16 NOTE — TELEPHONE ENCOUNTER
----- Message from Michell Cotto sent at 10/16/2019  2:37 PM CDT -----  Contact: [phone]  Patient Returning Your Phone Call

## 2020-03-02 ENCOUNTER — TELEPHONE (OUTPATIENT)
Dept: OBSTETRICS AND GYNECOLOGY | Facility: CLINIC | Age: 35
End: 2020-03-02

## 2020-03-02 NOTE — TELEPHONE ENCOUNTER
----- Message from Rojelio Avenadno sent at 2/29/2020 11:12 AM CST -----  Contact: pt   Pt would like a call back regarding having breast pain          Pt can be reached at  [phone]

## 2020-03-04 ENCOUNTER — PATIENT OUTREACH (OUTPATIENT)
Dept: ADMINISTRATIVE | Facility: OTHER | Age: 35
End: 2020-03-04

## 2020-05-16 ENCOUNTER — HOSPITAL ENCOUNTER (EMERGENCY)
Facility: HOSPITAL | Age: 35
Discharge: HOME | End: 2020-05-16
Attending: EMERGENCY MEDICINE
Payer: COMMERCIAL

## 2020-05-16 VITALS
WEIGHT: 124 LBS | RESPIRATION RATE: 15 BRPM | BODY MASS INDEX: 21.97 KG/M2 | HEART RATE: 72 BPM | OXYGEN SATURATION: 100 % | SYSTOLIC BLOOD PRESSURE: 117 MMHG | DIASTOLIC BLOOD PRESSURE: 78 MMHG | HEIGHT: 63 IN | TEMPERATURE: 98 F

## 2020-05-16 DIAGNOSIS — R07.89 ACUTE CHEST WALL PAIN: Primary | ICD-10-CM

## 2020-05-16 PROCEDURE — 36000 PLACE NEEDLE IN VEIN: CPT

## 2020-05-16 PROCEDURE — 93010 ELECTROCARDIOGRAM REPORT: CPT | Mod: ,,, | Performed by: INTERNAL MEDICINE

## 2020-05-16 PROCEDURE — 99284 EMERGENCY DEPT VISIT MOD MDM: CPT | Mod: 25

## 2020-05-16 PROCEDURE — 93010 EKG 12-LEAD: ICD-10-PCS | Mod: ,,, | Performed by: INTERNAL MEDICINE

## 2020-05-16 PROCEDURE — 93005 ELECTROCARDIOGRAM TRACING: CPT

## 2020-05-16 RX ORDER — ASPIRIN 325 MG
325 TABLET ORAL
Status: DISCONTINUED | OUTPATIENT
Start: 2020-05-16 | End: 2020-05-16

## 2020-05-16 NOTE — ED NOTES
Intermittent left side cp associated with sob and palpitations. Episode lasted for seconds and resolved without intervention.  She denies trauma to her chest. Denies lifting, pushing, and pulling heavy objects. Denies rash of any kind.     Pt is alert, age appropriate and in no acute distress. Respirations are even and unlabored. Bilateral breath sounds are clear throughout chest. abd is soft, not tender and not distended. pt denies change in feeding, bowel or bladder habits. Skin is warm and color is appropriate for ethnicity.  No edema noted to bilateral lower extremities. Pt moves all extremities well. Conjunctivae normal. Pt is dressed appropriately and well groomed.

## 2020-05-16 NOTE — DISCHARGE INSTRUCTIONS
Para el dolor:  [place] Tylenol 650 mg cada 4-6 horas. También tome Aleve 220 mg cada 12 horas O ibuprofeno 600-800 mg cada 6 horas.

## 2020-05-16 NOTE — ED PROVIDER NOTES
Encounter Date: 5/16/2020       History     Chief Complaint   Patient presents with    Chest Pain     pt presents to ED today c/o left arm pain that radiates to left upper chest and palpiations onset yesterday.     Palpitations     The patient is a 35-year-old female.  She denies chronic medical conditions but has a documented history of breast disorder and mental disorder.  She presents with chest pain that began two days ago.  She has intermittent left-sided chest discomfort that she notices with breathing.  She has associated shortness of breath.  These episodes last for a few seconds and resolved without treatment.  She denies like trauma to her chest.  She denies lifting, pushing, and pulling heavy objects.  She denies rashes in lesions to the skin of her chest.  She denies cough.  She has not taking any medications to attempt treatment for this chest discomfort.  She also to reports a period of frequent palpitations that occurred a couple of months ago and have since resolved.  She was having daily episodes in which her heart would race.  She used to smoke cigarettes but stop smoking two months ago when she began to have palpitations.      The history is provided by the patient. No  was used.     Review of patient's allergies indicates:  No Known Allergies  Past Medical History:   Diagnosis Date    Breast disorder     Mental disorder      Past Surgical History:   Procedure Laterality Date    TUBAL LIGATION       Family History   Problem Relation Age of Onset    Cancer Mother     No Known Problems Father     Breast cancer Maternal Grandmother      Social History     Tobacco Use    Smoking status: Former Smoker    Smokeless tobacco: Never Used   Substance Use Topics    Alcohol use: Yes     Frequency: Never     Binge frequency: Never    Drug use: No     Review of Systems   Constitutional: Negative for chills and fever.   HENT: Negative for sore throat and trouble swallowing.     Eyes: Negative for visual disturbance.   Respiratory: Positive for shortness of breath. Negative for cough.    Cardiovascular: Positive for chest pain and palpitations. Negative for leg swelling.   Gastrointestinal: Negative for abdominal pain, nausea and vomiting.       Physical Exam     Initial Vitals [05/16/20 1052]   BP Pulse Resp Temp SpO2   120/75 73 20 98.4 °F (36.9 °C) 100 %      MAP       --         Physical Exam    Nursing note and vitals reviewed.  Constitutional: She appears well-developed and well-nourished. She is not diaphoretic. No distress.   HENT:   Head: Normocephalic and atraumatic.   Mouth/Throat: Oropharynx is clear and moist.   Eyes: Conjunctivae are normal. No scleral icterus.   Neck: No JVD present.   Cardiovascular: Normal rate, regular rhythm and normal heart sounds. Exam reveals no gallop and no friction rub.    No murmur heard.  Pulmonary/Chest: Breath sounds normal. No stridor. No respiratory distress. She has no wheezes. She has no rhonchi. She has no rales. She exhibits tenderness.   Diffuse tenderness to the left chest.   Abdominal: Soft. She exhibits no distension. There is no tenderness.   Musculoskeletal:   No calf swelling or tenderness bilaterally. Negative bilateral Gaurang's sign.   Neurological: She is alert and oriented to person, place, and time. GCS score is 15. GCS eye subscore is 4. GCS verbal subscore is 5. GCS motor subscore is 6.   Skin: Skin is warm and dry. No pallor.         ED Course   Procedures  Labs Reviewed - No data to display  EKG Readings: (Independently Interpreted)   05/16/2020 13:52  Normal sinus rhythm. Ventricular rate 67 bpm.  Normal axis.  Normal QRS and QT intervals.  No ST segment elevation or depression.  Normal T-wave morphology. Overall impression:  Normal EKG.       Imaging Results          X-Ray Chest AP Portable (Final result)  Result time 05/16/20 12:42:31    Final result by Bryant Rutledge MD (05/16/20 12:42:31)                 Impression:       As above.      Electronically signed by: Bryant Rutledge MD  Date:    05/16/2020  Time:    12:42             Narrative:    EXAMINATION:  XR CHEST AP PORTABLE    CLINICAL HISTORY:  Chest Pain;    TECHNIQUE:  Single frontal view of the chest was performed.    COMPARISON:  09/02/2012    FINDINGS:  No consolidation, pleural effusion or pneumothorax.  Cardiomediastinal silhouette is unremarkable.                                 Medical Decision Making:   Independently Interpreted Test(s):   I have ordered and independently interpreted EKG Reading(s) - see prior notes  Clinical Tests:   Lab Tests: Ordered and Reviewed  Radiological Study: Ordered and Reviewed  Medical Tests: Ordered and Reviewed    Medical decision making:  This patient has undergone evaluation for intermittent left-sided chest pain.  She is afebrile with stable vitals.  She is in no respiratory distress.  Her breath sounds are clear.  She has diffuse tenderness to the left chest wall on exam.  EKG is normal.  Chest x-ray is normal.  She is low risk for acute coronary syndromes, PE, aortic dissection, and other more serious etiologies of chest pain.  Feel that this pain is of a musculoskeletal origin.  She has been instructed to attempt treatment with over-the-counter analgesics.  PCP follow-up advised.  She has been instructed to return to the ED for worsened pain, breathing difficulty, or for any other concerns.                                 Clinical Impression:       ICD-10-CM ICD-9-CM   1. Acute chest wall pain R07.89 786.52         Disposition:   Disposition: Discharged  Condition: Stable                        Tirso Whiteside III, MD  05/16/20 [id number]

## 2020-06-12 ENCOUNTER — TELEPHONE (OUTPATIENT)
Dept: OBSTETRICS AND GYNECOLOGY | Facility: CLINIC | Age: 35
End: 2020-06-12

## 2020-06-12 NOTE — TELEPHONE ENCOUNTER
Called patient and left message stating that if she needs to reschedule her appointment with [facility] to call me back.

## 2020-06-18 ENCOUNTER — PATIENT OUTREACH (OUTPATIENT)
Dept: ADMINISTRATIVE | Facility: OTHER | Age: 35
End: 2020-06-18

## 2020-06-27 ENCOUNTER — NURSE TRIAGE (OUTPATIENT)
Dept: ADMINISTRATIVE | Facility: CLINIC | Age: 35
End: 2020-06-27

## 2020-06-27 NOTE — TELEPHONE ENCOUNTER
Feeling sick for the last 3 days.  Some mild sob with sitting.   Discussed that she should be seen today and discussed option of urgent care on [address] which is close by her.  Called urgent care [address] to verify they are open and they say they are open until 6 pm today and are doing covid testing.    Reason for Disposition   MILD difficulty breathing (e.g., minimal/no SOB at rest, SOB with walking, pulse <100)    Additional Information   Negative: SEVERE difficulty breathing (e.g., struggling for each breath, speaks in single words)   Negative: Difficult to awaken or acting confused (e.g., disoriented, slurred speech)   Negative: Bluish (or gray) lips or face now   Negative: Shock suspected (e.g., cold/pale/clammy skin, too weak to stand, low BP, rapid pulse)   Negative: Sounds like a life-threatening emergency to the triager   Negative: [1] COVID-19 exposure AND [2] NO symptoms   Negative: COVID-19 and Breastfeeding, questions about   Negative: [1] Adult with possible COVID-19 symptoms AND [2] triager concerned about severity of symptoms or other causes   Negative: SEVERE or constant chest pain or pressure (Exception: mild central chest pain, present only when coughing)   Negative: MODERATE difficulty breathing (e.g., speaks in phrases, SOB even at rest, pulse 100-120)   Negative: Patient sounds very sick or weak to the triager    Protocols used: CORONAVIRUS (COVID-19) DIAGNOSED OR [id number]

## 2020-06-28 ENCOUNTER — OFFICE VISIT (OUTPATIENT)
Dept: URGENT CARE | Facility: CLINIC | Age: 35
End: 2020-06-28
Payer: COMMERCIAL

## 2020-06-28 VITALS
RESPIRATION RATE: 16 BRPM | OXYGEN SATURATION: 99 % | SYSTOLIC BLOOD PRESSURE: 130 MMHG | WEIGHT: 124 LBS | HEART RATE: 73 BPM | HEIGHT: 63 IN | TEMPERATURE: 98 F | BODY MASS INDEX: 21.97 KG/M2 | DIASTOLIC BLOOD PRESSURE: 90 MMHG

## 2020-06-28 DIAGNOSIS — B34.9 VIRAL SYNDROME: ICD-10-CM

## 2020-06-28 DIAGNOSIS — Z20.822 EXPOSURE TO COVID-19 VIRUS: Primary | ICD-10-CM

## 2020-06-28 PROCEDURE — U0003 INFECTIOUS AGENT DETECTION BY NUCLEIC ACID (DNA OR RNA); SEVERE ACUTE RESPIRATORY SYNDROME CORONAVIRUS 2 (SARS-COV-2) (CORONAVIRUS DISEASE [COVID-19]), AMPLIFIED PROBE TECHNIQUE, MAKING USE OF HIGH THROUGHPUT TECHNOLOGIES AS DESCRIBED BY CMS-2020-01-R: HCPCS

## 2020-06-28 PROCEDURE — 99203 OFFICE O/P NEW LOW 30 MIN: CPT | Mod: S$GLB,,, | Performed by: PHYSICIAN ASSISTANT

## 2020-06-28 PROCEDURE — 99203 PR OFFICE/OUTPT VISIT, NEW, LEVL III, 30-44 MIN: ICD-10-PCS | Mod: S$GLB,,, | Performed by: PHYSICIAN ASSISTANT

## 2020-06-28 NOTE — PROGRESS NOTES
"Subjective:       Patient ID: Deirdre Prado is a 35 y.o. female.    Vitals:  height is 5' 3" (1.6 m) and weight is 56.2 kg (124 lb). Her tympanic temperature is 97.8 °F (36.6 °C). Her blood pressure is 130/90 (abnormal) and her pulse is 73. Her respiration is 16 and oxygen saturation is 99%.     Chief Complaint: Headache    35-year-old female who presents with  for evaluation.  Patient reports a 4 day history of headache, generalized body ache, and chills.  Her symptoms have improved compared to initially.   She has been taking Tylenol for symptoms. She was also exposed to someone who was positive with COVID 1 week ago.  Patient requesting COVID swab.    Patient denies any nuchal rigidity, vision changes, hearing loss, focal weakness or deficits, or seizure activity.  Patient denies any recent URI symptoms,  loss of taste/smell, cough, weakness, fever, sweats,  palpitations, difficulty swallowing, sore throat, swollen glands, chest pain, shortness of breath, dizziness, lightheadedness with position changes, dysuria, hematuria, rectal bleeding, bladder/bowel incontinence, flank pain, abdominal pain,  nausea/vomiting, or diarrhea.        Constitution: Positive for chills. Negative for activity change, appetite change, sweating, fatigue, fever and generalized weakness.   HENT: Negative for ear pain, hearing loss, facial swelling, congestion, postnasal drip, sinus pain, sinus pressure, sore throat, trouble swallowing and voice change.    Neck: Negative for neck pain, neck stiffness and painful lymph nodes.   Cardiovascular: Negative for chest pain, leg swelling, palpitations, sob on exertion and passing out.   Eyes: Negative for eye discharge, eye pain, photophobia, vision loss, double vision and blurred vision.   Respiratory: Negative for chest tightness, cough, sputum production, bloody sputum, COPD, shortness of breath, stridor, wheezing and asthma.    Gastrointestinal: Negative for abdominal pain, nausea, " vomiting, constipation, diarrhea, bright red blood in stool, rectal bleeding, heartburn and bowel incontinence.   Genitourinary: Negative for dysuria, frequency, urgency, urine decreased, flank pain, bladder incontinence, hematuria and history of kidney stones.   Musculoskeletal: Positive for muscle cramps. Negative for trauma, joint pain, joint swelling, abnormal ROM of joint and muscle ache.   Skin: Negative for color change, pale, rash, wound and bruising.   Allergic/Immunologic: Negative for seasonal allergies, asthma and immunocompromised state.   Neurological: Positive for headaches and history of migraines. Negative for dizziness, history of vertigo, light-headedness, passing out, facial drooping, speech difficulty, coordination disturbances, loss of balance, disorientation, altered mental status, loss of consciousness, numbness, tingling and seizures.   Hematologic/Lymphatic: Negative for swollen lymph nodes, easy bruising/bleeding and trouble clotting. Does not bruise/bleed easily.   Psychiatric/Behavioral: Negative for altered mental status, disorientation, nervous/anxious, sleep disturbance and depression. The patient is not nervous/anxious.        Objective:      Physical Exam   Constitutional: She is oriented to person, place, and time. She appears well-developed. She is cooperative.  Non-toxic appearance. She does not appear ill. No distress.   HENT:   Head: Normocephalic and atraumatic.   Right Ear: Hearing, external ear and ear canal normal. No drainage, swelling or tenderness.   Left Ear: Hearing, external ear and ear canal normal. No drainage, swelling or tenderness.   Nose: Nose normal. No rhinorrhea or purulent discharge. Right sinus exhibits no maxillary sinus tenderness and no frontal sinus tenderness. Left sinus exhibits no maxillary sinus tenderness and no frontal sinus tenderness.   Mouth/Throat: Uvula is midline, oropharynx is clear and moist and mucous membranes are normal. Mucous  membranes are moist. No trismus in the jaw. No uvula swelling. No oropharyngeal exudate, posterior oropharyngeal edema or posterior oropharyngeal erythema. Tonsils are 2+ on the right. Tonsils are 2+ on the left. No tonsillar exudate.   Eyes: Pupils are equal, round, and reactive to light. Conjunctivae, EOM and lids are normal. Right eye exhibits no discharge. Left eye exhibits no discharge.   Neck: Normal range of motion and full passive range of motion without pain. Neck supple. No neck rigidity.   Cardiovascular: Normal rate, regular rhythm, normal heart sounds and normal pulses.   Pulmonary/Chest: Effort normal and breath sounds normal. No accessory muscle usage or stridor. No respiratory distress. She has no wheezes. She exhibits no tenderness.   Abdominal: Soft. Normal appearance and bowel sounds are normal. She exhibits no distension and no mass. There is no splenomegaly or hepatomegaly. There is no abdominal tenderness. There is no rebound, no guarding, no tenderness at McBurney's point and negative Bales's sign.   Musculoskeletal: Normal range of motion.      Right lower leg: No edema.      Left lower leg: No edema.      Comments: 5/5 strength and ROM in all extremities.  Gait normal.   Lymphadenopathy:     She has no cervical adenopathy.   Neurological: She is alert and oriented to person, place, and time. She has normal motor skills, normal sensation and intact cranial nerves. She displays no weakness and normal reflexes. No cranial nerve deficit or sensory deficit. She exhibits normal muscle tone. Gait and coordination normal. Coordination normal.   Skin: Skin is warm, dry and not diaphoretic. Capillary refill takes less than 2 seconds.   Psychiatric: Her behavior is normal. Thought content normal.   Nursing note and vitals reviewed.        Assessment:       1. Exposure to Covid-19 Virus    2. Viral syndrome          On exam, patient is nontoxic appearing and vitals are stable.  Patient is essentially  neurovascularly intact on exam.  POCT clinic testing done for COVID-19 nasal swab.  COVID-19 precautions were reiterated.  We will notify patient of the results in the next 24-72 hours; can receive results on Mychart. Patient was recommended OTC treatments for their symptoms.      Patient was instructed to return for re-evaluation for any worsening or change in current symptoms. Strict ED versus clinic precautions given in depth. Discharge and follow-up instructions given verbally/printed with the patient who expressed understanding and willingness to comply with my recommendations.  Patient verbalized understanding and agreed with the entirety of plan of care.      Plan:         Exposure to Covid-19 Virus    Viral syndrome           Patient Instructions     PLEASE READ YOUR DISCHARGE INSTRUCTIONS ENTIRELY AS IT CONTAINS IMPORTANT INFORMATION.    Patient had covid testing done today.  We will notify you of your results in the next 1-3 days. You can also receive the results on my chart. Quarantine at home until you receive results.  Patient understand that they cannot return to work until they are given their results and further recommendations.   If Negative: symptom free without fever reducing meds in 24 hours - can go back to work in 24 hours with surgical mask for 14 days.  If Positive: 3 days since improved symptoms, no fever without fever reducing meds - have to be out for a minimum of 10 days passed from when symptoms first appeared with improvements in respiratory symptoms.    If patient is asymptomatic, patient will still need to be quarantine for at least 10 days from exact known exposure date OR from positive test results date.    Discussed corona virus precautions and reviewed CDC FAC; printed a copy for patient.  I discussed to continue to monitor their symptoms. Discussed that if their symptoms persist or worsen to seek re-evaluation. Clinic vs. ER precautions were given.  Patient verbalized  understanding and agreed with the above plan of care.    - Rest.  Drink plenty of fluids.    - Tylenol as directed as needed for fever/pain.  For Tylenol, do not exceed 4000 mg/ day.       -You must understand that you've received an Urgent Care treatment only and that you may be released before all your medical problems are known or treated. You, the patient, will    arrange for follow up care as instructed. Please arrange follow up with your primary medical clinic within 2-5 days if your signs and symptoms have not resolved or worsen.   - Follow up with your PCP or specialty clinic as directed.  You can call [phone] or [phone] to schedule an appointment with the appropriate provider.    - If your condition worsens or fails to improve we recommend that you receive another evaluation at the emergency room immediately or contact your primary medical clinic to discuss your concerns.                Instructions for Patients Awaiting COVID-19 Test Results    You will either be called with your test result or it will be released to the patient portal.  If you have any questions about your test, please visit [email or web address] or call our COVID-19 information line at [phone].    Prevention steps for patients with confirmed or suspected COVID-19       Stay home and stay away from family members and friends. The CDC says, you can leave home after these three things have happened: 1) You have had no fever for at least 72 hours (that is three full days of no fever without the use of medicine that reduces fevers) 2) AND other symptoms have improved (for example, when your cough or shortness of breath have improved) 3) AND at least 10 days have passed since your symptoms first appeared.   Separate yourself from other people and animals in your home.   Call ahead before visiting your doctor.   Wear a facemask.   Cover your coughs and sneezes.   Wash your hands often with soap and water; hand   can be used, too.   Avoid sharing personal household items.   Wipe down surfaces used daily.   Monitor your symptoms. Seek prompt medical attention if your illness is worsening (e.g., difficulty breathing).    Before seeking care, call your healthcare provider.   If you have a medical emergency and need to call 911, notify the dispatch personnel that you have, or are being evaluated for COVID-19. If possible, put on a facemask before emergency medical services arrive.        Recommended precautions for household members, intimate partners, and caregivers in a home setting of a patient with symptomatic laboratory-confirmed COVID-19 or a patient under investigation.  Household members, intimate partners, and caregivers in the home setting awaiting tests results have close contact with a person with symptomatic, laboratory-confirmed COVID-19 or a person under investigation. Close contacts should monitor their health; they should call their provider right away if they develop symptoms suggestive of COVID-19 (e.g., fever, cough, shortness of breath).    Close contacts should also follow these recommendations:   Make sure that you understand and can help the patient follow their provider's instructions for medication(s) and care. You should help the patient with basic needs in the home and provide support for getting groceries, prescriptions, and other personal needs.   Monitor the patient's symptoms. If the patient is getting sicker, call his or her healthcare provider and tell them that the patient has laboratory-confirmed COVID-19. If the patient has a medical emergency and you need to call 911, notify the dispatch personnel that the patient has, or is being evaluated for COVID-19.   Household members should stay in another room or be  from the patient. Household members should use a separate bedroom and bathroom, if available.   Prohibit visitors.   Household members should care for any  pets in the home.   Make sure that shared spaces in the home have good air flow, such as by an air conditioner or an opened window, weather permitting.   Perform hand hygiene frequently. Wash your hands often with soap and water for at least 20 seconds or use an alcohol-based hand  (that contains > 60% alcohol) covering all surfaces of your hands and rubbing them together until they feel dry. Soap and water should be used preferentially.   Avoid touching your eyes, nose, and mouth.   The patient should wear a facemask. If the patient is not able to wear a facemask (for example, because it causes trouble breathing), caregivers should wear a mask when they are in the same room as the patient.   Wear a disposable facemask and gloves when you touch or have contact with the patient's blood, stool, or body fluids, such as saliva, sputum, nasal mucus, vomit, urine.  o Throw out disposable facemasks and gloves after using them. Do not reuse.  o When removing personal protective equipment, first remove and dispose of gloves. Then, immediately clean your hands with soap and water or alcohol-based hand . Next, remove and dispose of facemask, and immediately clean your hands again with soap and water or alcohol-based hand .   You should not share dishes, drinking glasses, cups, eating utensils, towels, bedding, or other items with the patient. After the patient uses these items, you should wash them thoroughly (see below Wash laundry thoroughly).   Clean all high-touch surfaces, such as counters, tabletops, doorknobs, bathroom fixtures, toilets, phones, keyboards, tablets, and bedside tables, every day. Also, clean any surfaces that may have blood, stool, or body fluids on them.   Use a household cleaning spray or wipe, according to the label instructions. Labels contain instructions for safe and effective use of the cleaning product including precautions you should take when applying the  product, such as wearing gloves and making sure you have good ventilation during use of the product.   Wash laundry thoroughly.  o Immediately remove and wash clothes or bedding that have blood, stool, or body fluids on them.  o Wear disposable gloves while handling soiled items and keep soiled items away from your body. Clean your hands (with soap and water or an alcohol-based hand ) immediately after removing your gloves.  o Read and follow directions on labels of laundry or clothing items and detergent. In general, using a normal laundry detergent according to washing machine instructions and dry thoroughly using the warmest temperatures recommended on the clothing label.   Place all used disposable gloves, facemasks, and other contaminated items in a lined container before disposing of them with other household waste. Clean your hands (with soap and water or an alcohol-based hand ) immediately after handling these items. Soap and water should be used preferentially if hands are visibly dirty.   Discuss any additional questions with your state or local health department or healthcare provider. Check available hours when contacting your local health department.    For more information see CDC link below.      https://www.cdc.gov/coronavirus/2019-ncov/hcp/guidance-prevent-spread.html#precautions        Sources:  [facility], [facility]          Instructions for Home Care of Patients and Caretakers with Coronavirus Disease 2019     Limit visitors to the home.  Older persons and those that have chronic medical conditions such as diabetes, lung and heart disease are at increased risk for illness.    If possible, patients should use a separate bedroom while recovering. Caregivers and household members should avoid prolonged contact with the patient which means to stay 6 feet away and avoid contact with cough droplets.  When close contact is necessary, wash your hands  before and immediately after contact.    Perform hand hygiene frequently. Wash your hands often with soap and water for at least 20 seconds or use an alcohol-based hand , covering all surfaces of your hands and rubbing them together until they feel dry.    Avoid touching your eyes, nose, and mouth with unwashed hands.   Avoid sharing household items with the patient. You should not share dishes, drinking glasses, cups, eating utensils, towels, bedding, or other items. After the patient uses these items, you should wash them thoroughly.   Wash laundry thoroughly.   o Immediately remove and wash clothes or bedding that have blood, stool, or body fluids on them.   Clean all high-touch surfaces, such as counters, tabletops, doorknobs, bathroom fixtures, toilets, phones, keyboards, tablets, and bedside tables, every day.   o Use a household cleaning spray or wipe, according to the label instructions. Labels contain instructions for safe and effective use of the cleaning product including precautions you should take when applying the product, such as wearing gloves and making sure you have good ventilation during use of the product.    For more information see CDC link below.      https://www.cdc.gov/coronavirus/2019-ncov/hcp/guidance-prevent-spread.html#precautions               If your symptoms worsen or if you have any other concerns, please contact [facility] at [phone].

## 2020-06-28 NOTE — PATIENT INSTRUCTIONS
PLEASE READ YOUR DISCHARGE INSTRUCTIONS ENTIRELY AS IT CONTAINS IMPORTANT INFORMATION.    Patient had covid testing done today.  We will notify you of your results in the next 1-3 days. You can also receive the results on my chart. Quarantine at home until you receive results.  Patient understand that they cannot return to work until they are given their results and further recommendations.   If Negative: symptom free without fever reducing meds in 24 hours - can go back to work in 24 hours with surgical mask for 14 days.  If Positive: 3 days since improved symptoms, no fever without fever reducing meds - have to be out for a minimum of 10 days passed from when symptoms first appeared with improvements in respiratory symptoms.    If patient is asymptomatic, patient will still need to be quarantine for at least 10 days from exact known exposure date OR from positive test results date.    Discussed corona virus precautions and reviewed CDC FAC; printed a copy for patient.  I discussed to continue to monitor their symptoms. Discussed that if their symptoms persist or worsen to seek re-evaluation. Clinic vs. ER precautions were given.  Patient verbalized understanding and agreed with the above plan of care.    - Rest.  Drink plenty of fluids.    - Tylenol as directed as needed for fever/pain.  For Tylenol, do not exceed 4000 mg/ day.       -You must understand that you've received an Urgent Care treatment only and that you may be released before all your medical problems are known or treated. You, the patient, will    arrange for follow up care as instructed. Please arrange follow up with your primary medical clinic within 2-5 days if your signs and symptoms have not resolved or worsen.   - Follow up with your PCP or specialty clinic as directed.  You can call [phone] or [phone] to schedule an appointment with the appropriate provider.    - If your condition worsens or fails to improve we recommend that  you receive another evaluation at the emergency room immediately or contact your primary medical clinic to discuss your concerns.                Instructions for Patients Awaiting COVID-19 Test Results    You will either be called with your test result or it will be released to the patient portal.  If you have any questions about your test, please visit [email or web address] or call our COVID-19 information line at [phone].    Prevention steps for patients with confirmed or suspected COVID-19       Stay home and stay away from family members and friends. The CDC says, you can leave home after these three things have happened: 1) You have had no fever for at least 72 hours (that is three full days of no fever without the use of medicine that reduces fevers) 2) AND other symptoms have improved (for example, when your cough or shortness of breath have improved) 3) AND at least 10 days have passed since your symptoms first appeared.   Separate yourself from other people and animals in your home.   Call ahead before visiting your doctor.   Wear a facemask.   Cover your coughs and sneezes.   Wash your hands often with soap and water; hand  can be used, too.   Avoid sharing personal household items.   Wipe down surfaces used daily.   Monitor your symptoms. Seek prompt medical attention if your illness is worsening (e.g., difficulty breathing).    Before seeking care, call your healthcare provider.   If you have a medical emergency and need to call 911, notify the dispatch personnel that you have, or are being evaluated for COVID-19. If possible, put on a facemask before emergency medical services arrive.        Recommended precautions for household members, intimate partners, and caregivers in a home setting of a patient with symptomatic laboratory-confirmed COVID-19 or a patient under investigation.  Household members, intimate partners, and caregivers in the home setting awaiting tests  results have close contact with a person with symptomatic, laboratory-confirmed COVID-19 or a person under investigation. Close contacts should monitor their health; they should call their provider right away if they develop symptoms suggestive of COVID-19 (e.g., fever, cough, shortness of breath).    Close contacts should also follow these recommendations:   Make sure that you understand and can help the patient follow their provider's instructions for medication(s) and care. You should help the patient with basic needs in the home and provide support for getting groceries, prescriptions, and other personal needs.   Monitor the patient's symptoms. If the patient is getting sicker, call his or her healthcare provider and tell them that the patient has laboratory-confirmed COVID-19. If the patient has a medical emergency and you need to call 911, notify the dispatch personnel that the patient has, or is being evaluated for COVID-19.   Household members should stay in another room or be  from the patient. Household members should use a separate bedroom and bathroom, if available.   Prohibit visitors.   Household members should care for any pets in the home.   Make sure that shared spaces in the home have good air flow, such as by an air conditioner or an opened window, weather permitting.   Perform hand hygiene frequently. Wash your hands often with soap and water for at least 20 seconds or use an alcohol-based hand  (that contains > 60% alcohol) covering all surfaces of your hands and rubbing them together until they feel dry. Soap and water should be used preferentially.   Avoid touching your eyes, nose, and mouth.   The patient should wear a facemask. If the patient is not able to wear a facemask (for example, because it causes trouble breathing), caregivers should wear a mask when they are in the same room as the patient.   Wear a disposable facemask and gloves when you touch or have  contact with the patient's blood, stool, or body fluids, such as saliva, sputum, nasal mucus, vomit, urine.  o Throw out disposable facemasks and gloves after using them. Do not reuse.  o When removing personal protective equipment, first remove and dispose of gloves. Then, immediately clean your hands with soap and water or alcohol-based hand . Next, remove and dispose of facemask, and immediately clean your hands again with soap and water or alcohol-based hand .   You should not share dishes, drinking glasses, cups, eating utensils, towels, bedding, or other items with the patient. After the patient uses these items, you should wash them thoroughly (see below Wash laundry thoroughly).   Clean all high-touch surfaces, such as counters, tabletops, doorknobs, bathroom fixtures, toilets, phones, keyboards, tablets, and bedside tables, every day. Also, clean any surfaces that may have blood, stool, or body fluids on them.   Use a household cleaning spray or wipe, according to the label instructions. Labels contain instructions for safe and effective use of the cleaning product including precautions you should take when applying the product, such as wearing gloves and making sure you have good ventilation during use of the product.   Wash laundry thoroughly.  o Immediately remove and wash clothes or bedding that have blood, stool, or body fluids on them.  o Wear disposable gloves while handling soiled items and keep soiled items away from your body. Clean your hands (with soap and water or an alcohol-based hand ) immediately after removing your gloves.  o Read and follow directions on labels of laundry or clothing items and detergent. In general, using a normal laundry detergent according to washing machine instructions and dry thoroughly using the warmest temperatures recommended on the clothing label.   Place all used disposable gloves, facemasks, and other contaminated items in a  lined container before disposing of them with other household waste. Clean your hands (with soap and water or an alcohol-based hand ) immediately after handling these items. Soap and water should be used preferentially if hands are visibly dirty.   Discuss any additional questions with your state or local health department or healthcare provider. Check available hours when contacting your local health department.    For more information see CDC link below.      https://www.cdc.gov/coronavirus/2019-ncov/hcp/guidance-prevent-spread.html#precautions        Sources:  [facility], [place] Department [facility] and [facility]          Instructions for Home Care of Patients and Caretakers with Coronavirus Disease 2019     Limit visitors to the home.  Older persons and those that have chronic medical conditions such as diabetes, lung and heart disease are at increased risk for illness.    If possible, patients should use a separate bedroom while recovering. Caregivers and household members should avoid prolonged contact with the patient which means to stay 6 feet away and avoid contact with cough droplets.  When close contact is necessary, wash your hands before and immediately after contact.    Perform hand hygiene frequently. Wash your hands often with soap and water for at least 20 seconds or use an alcohol-based hand , covering all surfaces of your hands and rubbing them together until they feel dry.    Avoid touching your eyes, nose, and mouth with unwashed hands.   Avoid sharing household items with the patient. You should not share dishes, drinking glasses, cups, eating utensils, towels, bedding, or other items. After the patient uses these items, you should wash them thoroughly.   Wash laundry thoroughly.   o Immediately remove and wash clothes or bedding that have blood, stool, or body fluids on them.   Clean all high-touch surfaces, such as counters, tabletops, doorknobs, bathroom fixtures,  toilets, phones, keyboards, tablets, and bedside tables, every day.   o Use a household cleaning spray or wipe, according to the label instructions. Labels contain instructions for safe and effective use of the cleaning product including precautions you should take when applying the product, such as wearing gloves and making sure you have good ventilation during use of the product.    For more information see CDC link below.      https://www.cdc.gov/coronavirus/2019-ncov/hcp/guidance-prevent-spread.html#precautions               If your symptoms worsen or if you have any other concerns, please contact [facility] at [phone].

## 2020-07-02 ENCOUNTER — TELEPHONE (OUTPATIENT)
Dept: URGENT CARE | Facility: CLINIC | Age: 35
End: 2020-07-02

## 2020-07-02 DIAGNOSIS — U07.1 COVID-19 VIRUS DETECTED: ICD-10-CM

## 2020-07-02 LAB — SARS-COV-2 RNA RESP QL NAA+PROBE: DETECTED

## 2020-10-01 ENCOUNTER — PATIENT OUTREACH (OUTPATIENT)
Dept: ADMINISTRATIVE | Facility: HOSPITAL | Age: 35
End: 2020-10-01

## 2020-10-30 ENCOUNTER — LAB VISIT (OUTPATIENT)
Dept: LAB | Facility: HOSPITAL | Age: 35
End: 2020-10-30
Attending: INTERNAL MEDICINE
Payer: COMMERCIAL

## 2020-10-30 ENCOUNTER — OFFICE VISIT (OUTPATIENT)
Dept: FAMILY MEDICINE | Facility: CLINIC | Age: 35
End: 2020-10-30
Payer: COMMERCIAL

## 2020-10-30 VITALS
HEIGHT: 63 IN | SYSTOLIC BLOOD PRESSURE: 108 MMHG | OXYGEN SATURATION: 99 % | BODY MASS INDEX: 22.54 KG/M2 | DIASTOLIC BLOOD PRESSURE: 76 MMHG | WEIGHT: 127.19 LBS | TEMPERATURE: 98 F | HEART RATE: 86 BPM

## 2020-10-30 DIAGNOSIS — Z00.00 ROUTINE ADULT HEALTH MAINTENANCE: Primary | ICD-10-CM

## 2020-10-30 DIAGNOSIS — M25.571 ACUTE RIGHT ANKLE PAIN: ICD-10-CM

## 2020-10-30 DIAGNOSIS — Z11.59 ENCOUNTER FOR HEPATITIS C SCREENING TEST FOR LOW RISK PATIENT: ICD-10-CM

## 2020-10-30 DIAGNOSIS — Z11.4 ENCOUNTER FOR SCREENING FOR HUMAN IMMUNODEFICIENCY VIRUS (HIV): ICD-10-CM

## 2020-10-30 DIAGNOSIS — R00.2 PALPITATIONS: ICD-10-CM

## 2020-10-30 DIAGNOSIS — Z00.00 ROUTINE ADULT HEALTH MAINTENANCE: ICD-10-CM

## 2020-10-30 DIAGNOSIS — G43.009 MIGRAINE WITHOUT AURA AND WITHOUT STATUS MIGRAINOSUS, NOT INTRACTABLE: ICD-10-CM

## 2020-10-30 LAB
BILIRUB UR QL STRIP: NEGATIVE
CLARITY UR: CLEAR
COLOR UR: YELLOW
GLUCOSE UR QL STRIP: NEGATIVE
HGB UR QL STRIP: NEGATIVE
KETONES UR QL STRIP: NEGATIVE
LEUKOCYTE ESTERASE UR QL STRIP: NEGATIVE
NITRITE UR QL STRIP: NEGATIVE
PH UR STRIP: 8 [PH] (ref 5–8)
PROT UR QL STRIP: NEGATIVE
SP GR UR STRIP: 1 (ref 1–1.03)
URN SPEC COLLECT METH UR: NORMAL
UROBILINOGEN UR STRIP-ACNC: NEGATIVE EU/DL

## 2020-10-30 PROCEDURE — 90471 FLU VACCINE (QUAD) GREATER THAN OR EQUAL TO 3YO PRESERVATIVE FREE IM: ICD-10-PCS | Mod: S$GLB,,, | Performed by: INTERNAL MEDICINE

## 2020-10-30 PROCEDURE — 81003 URINALYSIS AUTO W/O SCOPE: CPT

## 2020-10-30 PROCEDURE — 99999 PR PBB SHADOW E&M-EST. PATIENT-LVL III: CPT | Mod: PBBFAC,,, | Performed by: INTERNAL MEDICINE

## 2020-10-30 PROCEDURE — 99999 PR PBB SHADOW E&M-EST. PATIENT-LVL III: ICD-10-PCS | Mod: PBBFAC,,, | Performed by: INTERNAL MEDICINE

## 2020-10-30 PROCEDURE — 90686 FLU VACCINE (QUAD) GREATER THAN OR EQUAL TO 3YO PRESERVATIVE FREE IM: ICD-10-PCS | Mod: S$GLB,,, | Performed by: INTERNAL MEDICINE

## 2020-10-30 PROCEDURE — 90471 IMMUNIZATION ADMIN: CPT | Mod: S$GLB,,, | Performed by: INTERNAL MEDICINE

## 2020-10-30 PROCEDURE — 3008F BODY MASS INDEX DOCD: CPT | Mod: CPTII,S$GLB,, | Performed by: INTERNAL MEDICINE

## 2020-10-30 PROCEDURE — 93010 ELECTROCARDIOGRAM REPORT: CPT | Mod: S$GLB,,, | Performed by: INTERNAL MEDICINE

## 2020-10-30 PROCEDURE — 90686 IIV4 VACC NO PRSV 0.5 ML IM: CPT | Mod: S$GLB,,, | Performed by: INTERNAL MEDICINE

## 2020-10-30 PROCEDURE — 99204 OFFICE O/P NEW MOD 45 MIN: CPT | Mod: 25,S$GLB,, | Performed by: INTERNAL MEDICINE

## 2020-10-30 PROCEDURE — 3008F PR BODY MASS INDEX (BMI) DOCUMENTED: ICD-10-PCS | Mod: CPTII,S$GLB,, | Performed by: INTERNAL MEDICINE

## 2020-10-30 PROCEDURE — 93005 ELECTROCARDIOGRAM TRACING: CPT | Mod: S$GLB,,, | Performed by: INTERNAL MEDICINE

## 2020-10-30 PROCEDURE — 99204 PR OFFICE/OUTPT VISIT, NEW, LEVL IV, 45-59 MIN: ICD-10-PCS | Mod: 25,S$GLB,, | Performed by: INTERNAL MEDICINE

## 2020-10-30 PROCEDURE — 93010 EKG 12-LEAD: ICD-10-PCS | Mod: S$GLB,,, | Performed by: INTERNAL MEDICINE

## 2020-10-30 PROCEDURE — 93005 EKG 12-LEAD: ICD-10-PCS | Mod: S$GLB,,, | Performed by: INTERNAL MEDICINE

## 2020-10-30 RX ORDER — AMITRIPTYLINE HYDROCHLORIDE 25 MG/1
TABLET, FILM COATED ORAL
COMMUNITY
Start: 2020-10-14

## 2020-10-30 RX ORDER — RIZATRIPTAN BENZOATE 10 MG/1
TABLET, ORALLY DISINTEGRATING ORAL
COMMUNITY
Start: 2020-10-14

## 2020-10-30 NOTE — PROGRESS NOTES
Subjective:       Patient ID: Deirdre Prado is a 35 y.o. female.    Chief Complaint: Establish Care      The patient is here to get established with new PCP. Reports has been having episodes of palpitations, bilateral hand and 1st toe tingling. When she checked her heart rate on smart watch showed pulse on 139-140, self limited. On Tuesday she was at work, without stress, when started with palpitations. Heart rate was 130's. She went home and heart rate was 120s, later improved. Tried cold water with little help. Denies chest pain, shortness of breath, fever, lightheadedness, leg swelling. Has lost some weight on purpose, eats 2 heathy meals, and a small meal for dinner. Has noted episodes of feeling hot. Palpitations can happen with exercise. She has been evaluated at the urgent care and told she had anxiety and chostchondritis but symptoms had resolved by the time she was evaluated.    A few weeks a go had right ankle and knee pain and swelling with no trauma and improved with ice pack and Tylenol.     She has hx of Migraines and has been taking Amitriptyline that tends to constipate her and Maxalt as needed. She has auras with eye trembling sensation. Medications have helped.    Review of Systems   Constitutional: Positive for unexpected weight change (Loosing weight as she is trying to loose weight with diet and exercise.). Negative for appetite change, chills, fatigue and fever.   HENT: Negative for nasal congestion, ear pain, hearing loss, rhinorrhea and sore throat.    Eyes: Negative for redness and visual disturbance.   Respiratory: Negative for cough and shortness of breath.    Cardiovascular: Positive for palpitations. Negative for chest pain and claudication.   Gastrointestinal: Positive for constipation. Negative for abdominal pain, change in bowel habit, diarrhea, nausea, vomiting and change in bowel habit.   Endocrine: Positive for heat intolerance.   Genitourinary: Positive for hot flashes.  Negative for bladder incontinence, difficulty urinating and dysuria.   Musculoskeletal: Positive for arthralgias and joint swelling (Right knee and ankle.).   Neurological: Positive for numbness (Tingling sensation of hands and toes with palpitations.) and headaches. Negative for dizziness, weakness, light-headedness and memory loss.   Hematological: Does not bruise/bleed easily.   Psychiatric/Behavioral: Negative for sleep disturbance and suicidal ideas. The patient is not nervous/anxious.       Past Medical History:   Diagnosis Date    Breast disorder     Mental disorder        Past Surgical History:   Procedure Laterality Date    TUBAL LIGATION         Family History   Problem Relation Age of Onset    Cancer Mother     No Known Problems Father     Breast cancer Maternal Grandmother        Social History     Socioeconomic History    Marital status:      Spouse name: Not on file    Number of children: Not on file    Years of education: Not on file    Highest education level: Not on file   Occupational History    Not on file   Social Needs    Financial resource strain: Not on file    Food insecurity     Worry: Not on file     Inability: Not on file    Transportation needs     Medical: Not on file     Non-medical: Not on file   Tobacco Use    Smoking status: Former Smoker    Smokeless tobacco: Never Used   Substance and Sexual Activity    Alcohol use: Yes     Frequency: Never     Binge frequency: Never    Drug use: No    Sexual activity: Yes     Partners: Male   Lifestyle    Physical activity     Days per week: Not on file     Minutes per session: Not on file    Stress: Not on file   Relationships    Social connections     Talks on phone: Not on file     Gets together: Not on file     Attends Yarsani service: Not on file     Active member of club or organization: Not on file     Attends meetings of clubs or organizations: Not on file     Relationship status: Not on file   Other Topics  "Concern    Not on file   Social History Narrative    Not on file       Current Outpatient Medications   Medication Sig Dispense Refill    amitriptyline (ELAVIL) 25 MG tablet TK 1 T PO HS      rizatriptan (MAXALT-MLT) 10 MG disintegrating tablet DIS 1 T ON THE TONGUE D PRF MIGRAINE HA       No current facility-administered medications for this visit.        Review of patient's allergies indicates:  No Known Allergies      Objective:       Last 3 sets of Vitals    Vitals - 1 value per visit 5/16/2020 6/28/2020 10/30/2020   SYSTOLIC 117 130 108   DIASTOLIC 78 90 76   PULSE 72 73 86   TEMPERATURE 98.4 97.8 98.4   RESPIRATIONS 15 16 -   SPO2 100 99 99   Weight (lb) 124 124 127.21   Weight (kg) 56.246 56.246 57.7   HEIGHT 5' 3" 5' 3" 5' 3"   BODY MASS INDEX 21.97 21.97 22.53   VISIT REPORT - - -   Pain Score  - 8 0   Physical Exam  Constitutional:       General: She is not in acute distress.     Appearance: Normal appearance. She is normal weight.   HENT:      Head: Normocephalic.      Right Ear: Tympanic membrane, ear canal and external ear normal.      Left Ear: Tympanic membrane, ear canal and external ear normal.      Nose: Nose normal.      Mouth/Throat:      Mouth: Mucous membranes are moist.      Pharynx: Oropharynx is clear.   Eyes:      General: No scleral icterus.     Extraocular Movements: Extraocular movements intact.      Conjunctiva/sclera: Conjunctivae normal.      Pupils: Pupils are equal, round, and reactive to light.   Neck:      Musculoskeletal: Neck supple.      Vascular: No carotid bruit.   Cardiovascular:      Rate and Rhythm: Normal rate and regular rhythm.      Pulses: Normal pulses.      Heart sounds: Normal heart sounds.   Pulmonary:      Effort: Pulmonary effort is normal.      Breath sounds: Normal breath sounds.   Abdominal:      General: Bowel sounds are normal. There is no distension.      Palpations: Abdomen is soft. There is no mass.      Tenderness: There is no abdominal tenderness. "   Musculoskeletal: Normal range of motion.         General: No swelling.   Lymphadenopathy:      Cervical: No cervical adenopathy.   Skin:     General: Skin is warm and dry.   Neurological:      General: No focal deficit present.      Mental Status: She is alert and oriented to person, place, and time.   Psychiatric:         Mood and Affect: Mood normal.         Behavior: Behavior normal.           ECG. NSR with no ischemic changes.      Assessment:       1. Routine adult health maintenance    2. Palpitations    3. Migraine without aura and with status migrainosus, not intractable    4. Acute right ankle pain    5. Encounter for screening for human immunodeficiency virus (HIV)    6. Encounter for hepatitis C screening test for low risk patient        Plan:       Deirdre was seen today for establish care.    Diagnoses and all orders for this visit:    Routine adult health maintenance  -     CBC Auto Differential; Future  -     Comprehensive Metabolic Panel; Future  -     Lipid Panel; Future  -     Urinalysis; Future  -     C-Reactive Protein; Future  -     T4, Free; Future  -     Vitamin D; Future    Palpitations  -     CBC Auto Differential; Future  -     TSH; Future  -     C-Reactive Protein; Future  -     T4, Free; Future  -     Vitamin D; Future  -     IN OFFICE EKG 12-LEAD (to Muse)  -     Holter monitor - 48 hour; Future  -     Echo Color Flow Doppler? Yes; Future    Migraine without aura and without status migrainosus, not intractable  -     C-Reactive Protein; Future  - Continue same treatment    Acute right ankle pain  -     C-Reactive Protein; Future  -     Uric Acid; Future  -     EVGENY Screen w/Reflex; Future  - No abnormalities at this time.    Encounter for screening for human immunodeficiency virus (HIV)  -     HIV 1/2 Ag/Ab (4th Gen); Future    Encounter for hepatitis C screening test for low risk patient  -     Hepatitis C Antibody; Future    Other orders  -     Influenza - Quadrivalent *Preferred* (6  months+) (PF)

## 2020-10-31 ENCOUNTER — LAB VISIT (OUTPATIENT)
Dept: LAB | Facility: HOSPITAL | Age: 35
End: 2020-10-31
Attending: INTERNAL MEDICINE
Payer: COMMERCIAL

## 2020-10-31 DIAGNOSIS — M25.571 ACUTE RIGHT ANKLE PAIN: ICD-10-CM

## 2020-10-31 DIAGNOSIS — Z00.00 ROUTINE ADULT HEALTH MAINTENANCE: ICD-10-CM

## 2020-10-31 DIAGNOSIS — G43.009 MIGRAINE WITHOUT AURA AND WITHOUT STATUS MIGRAINOSUS, NOT INTRACTABLE: ICD-10-CM

## 2020-10-31 DIAGNOSIS — R00.2 PALPITATIONS: ICD-10-CM

## 2020-10-31 DIAGNOSIS — Z11.59 ENCOUNTER FOR HEPATITIS C SCREENING TEST FOR LOW RISK PATIENT: ICD-10-CM

## 2020-10-31 DIAGNOSIS — Z11.4 ENCOUNTER FOR SCREENING FOR HUMAN IMMUNODEFICIENCY VIRUS (HIV): ICD-10-CM

## 2020-10-31 LAB
25(OH)D3+25(OH)D2 SERPL-MCNC: 19 NG/ML (ref 30–96)
ALBUMIN SERPL BCP-MCNC: 4.1 G/DL (ref 3.5–5.2)
ALP SERPL-CCNC: 34 U/L (ref 55–135)
ALT SERPL W/O P-5'-P-CCNC: 14 U/L (ref 10–44)
ANION GAP SERPL CALC-SCNC: 8 MMOL/L (ref 8–16)
AST SERPL-CCNC: 20 U/L (ref 10–40)
BASOPHILS # BLD AUTO: 0.01 K/UL (ref 0–0.2)
BASOPHILS NFR BLD: 0.2 % (ref 0–1.9)
BILIRUB SERPL-MCNC: 0.4 MG/DL (ref 0.1–1)
BUN SERPL-MCNC: 9 MG/DL (ref 6–20)
CALCIUM SERPL-MCNC: 8.9 MG/DL (ref 8.7–10.5)
CHLORIDE SERPL-SCNC: 106 MMOL/L (ref 95–110)
CHOLEST SERPL-MCNC: 186 MG/DL (ref 120–199)
CHOLEST/HDLC SERPL: 3.8 {RATIO} (ref 2–5)
CO2 SERPL-SCNC: 26 MMOL/L (ref 23–29)
CREAT SERPL-MCNC: 0.7 MG/DL (ref 0.5–1.4)
CRP SERPL-MCNC: 1.5 MG/L (ref 0–8.2)
DIFFERENTIAL METHOD: NORMAL
EOSINOPHIL # BLD AUTO: 0 K/UL (ref 0–0.5)
EOSINOPHIL NFR BLD: 0.6 % (ref 0–8)
ERYTHROCYTE [DISTWIDTH] IN BLOOD BY AUTOMATED COUNT: 13.2 % (ref 11.5–14.5)
EST. GFR  (AFRICAN AMERICAN): >60 ML/MIN/1.73 M^2
EST. GFR  (NON AFRICAN AMERICAN): >60 ML/MIN/1.73 M^2
GLUCOSE SERPL-MCNC: 95 MG/DL (ref 70–110)
HCT VFR BLD AUTO: 40.1 % (ref 37–48.5)
HDLC SERPL-MCNC: 49 MG/DL (ref 40–75)
HDLC SERPL: 26.3 % (ref 20–50)
HGB BLD-MCNC: 13 G/DL (ref 12–16)
IMM GRANULOCYTES # BLD AUTO: 0.02 K/UL (ref 0–0.04)
IMM GRANULOCYTES NFR BLD AUTO: 0.3 % (ref 0–0.5)
LDLC SERPL CALC-MCNC: 114.8 MG/DL (ref 63–159)
LYMPHOCYTES # BLD AUTO: 2.1 K/UL (ref 1–4.8)
LYMPHOCYTES NFR BLD: 33.2 % (ref 18–48)
MCH RBC QN AUTO: 29 PG (ref 27–31)
MCHC RBC AUTO-ENTMCNC: 32.4 G/DL (ref 32–36)
MCV RBC AUTO: 89 FL (ref 82–98)
MONOCYTES # BLD AUTO: 0.4 K/UL (ref 0.3–1)
MONOCYTES NFR BLD: 6.6 % (ref 4–15)
NEUTROPHILS # BLD AUTO: 3.8 K/UL (ref 1.8–7.7)
NEUTROPHILS NFR BLD: 59.1 % (ref 38–73)
NONHDLC SERPL-MCNC: 137 MG/DL
NRBC BLD-RTO: 0 /100 WBC
PLATELET # BLD AUTO: 163 K/UL (ref 150–350)
PMV BLD AUTO: 12.5 FL (ref 9.2–12.9)
POTASSIUM SERPL-SCNC: 4 MMOL/L (ref 3.5–5.1)
PROT SERPL-MCNC: 6.9 G/DL (ref 6–8.4)
RBC # BLD AUTO: 4.49 M/UL (ref 4–5.4)
SODIUM SERPL-SCNC: 140 MMOL/L (ref 136–145)
T4 FREE SERPL-MCNC: 0.98 NG/DL (ref 0.71–1.51)
TRIGL SERPL-MCNC: 111 MG/DL (ref 30–150)
TSH SERPL DL<=0.005 MIU/L-ACNC: 1.59 UIU/ML (ref 0.4–4)
URATE SERPL-MCNC: 4.3 MG/DL (ref 2.4–5.7)
WBC # BLD AUTO: 6.36 K/UL (ref 3.9–12.7)

## 2020-10-31 PROCEDURE — 86039 ANTINUCLEAR ANTIBODIES (ANA): CPT

## 2020-10-31 PROCEDURE — 80061 LIPID PANEL: CPT

## 2020-10-31 PROCEDURE — 84550 ASSAY OF BLOOD/URIC ACID: CPT

## 2020-10-31 PROCEDURE — 85025 COMPLETE CBC W/AUTO DIFF WBC: CPT

## 2020-10-31 PROCEDURE — 84443 ASSAY THYROID STIM HORMONE: CPT

## 2020-10-31 PROCEDURE — 82306 VITAMIN D 25 HYDROXY: CPT

## 2020-10-31 PROCEDURE — 86235 NUCLEAR ANTIGEN ANTIBODY: CPT | Mod: 59

## 2020-10-31 PROCEDURE — 36415 COLL VENOUS BLD VENIPUNCTURE: CPT

## 2020-10-31 PROCEDURE — 86140 C-REACTIVE PROTEIN: CPT

## 2020-10-31 PROCEDURE — 86803 HEPATITIS C AB TEST: CPT

## 2020-10-31 PROCEDURE — 84439 ASSAY OF FREE THYROXINE: CPT

## 2020-10-31 PROCEDURE — 86703 HIV-1/HIV-2 1 RESULT ANTBDY: CPT

## 2020-10-31 PROCEDURE — 86038 ANTINUCLEAR ANTIBODIES: CPT

## 2020-10-31 PROCEDURE — 80053 COMPREHEN METABOLIC PANEL: CPT

## 2020-11-02 ENCOUNTER — TELEPHONE (OUTPATIENT)
Dept: FAMILY MEDICINE | Facility: CLINIC | Age: 35
End: 2020-11-02

## 2020-11-02 DIAGNOSIS — R76.8 ANA POSITIVE: ICD-10-CM

## 2020-11-02 DIAGNOSIS — E55.9 VITAMIN D DEFICIENCY: Primary | ICD-10-CM

## 2020-11-02 LAB
ANA PATTERN 1: NORMAL
ANA PATTERN 2: NORMAL
ANA SER QL IF: POSITIVE
ANA TITER 2: NORMAL
ANA TITR SER IF: NORMAL {TITER}
HCV AB SERPL QL IA: NEGATIVE
HIV 1+2 AB+HIV1 P24 AG SERPL QL IA: NEGATIVE

## 2020-11-02 RX ORDER — ERGOCALCIFEROL 1.25 MG/1
50000 CAPSULE ORAL
Qty: 15 CAPSULE | Refills: 1 | Status: SHIPPED | OUTPATIENT
Start: 2020-11-02 | End: 2023-11-09

## 2020-11-03 LAB
ANTI SM ANTIBODY: 0.05 RATIO (ref 0–0.99)
ANTI SM/RNP ANTIBODY: 0.06 RATIO (ref 0–0.99)
ANTI-SM INTERPRETATION: NEGATIVE
ANTI-SM/RNP INTERPRETATION: NEGATIVE
ANTI-SSA ANTIBODY: 0.05 RATIO (ref 0–0.99)
ANTI-SSA INTERPRETATION: NEGATIVE
ANTI-SSB ANTIBODY: 0.06 RATIO (ref 0–0.99)
ANTI-SSB INTERPRETATION: NEGATIVE
DSDNA AB SER-ACNC: NORMAL [IU]/ML

## 2020-11-03 NOTE — TELEPHONE ENCOUNTER
Labs received and noted with positive EVGENY (CMP and CBC are normal) and low vit D. The patient was called to inform referral to rheumatology for further evaluate significance of EVGENY and non spc symptoms and normal tests.

## 2020-11-04 ENCOUNTER — OFFICE VISIT (OUTPATIENT)
Dept: RHEUMATOLOGY | Facility: CLINIC | Age: 35
End: 2020-11-04
Payer: COMMERCIAL

## 2020-11-04 ENCOUNTER — HOSPITAL ENCOUNTER (OUTPATIENT)
Dept: CARDIOLOGY | Facility: HOSPITAL | Age: 35
Discharge: HOME | End: 2020-11-04
Attending: INTERNAL MEDICINE
Payer: COMMERCIAL

## 2020-11-04 ENCOUNTER — LAB VISIT (OUTPATIENT)
Dept: LAB | Facility: HOSPITAL | Age: 35
End: 2020-11-04
Attending: INTERNAL MEDICINE
Payer: COMMERCIAL

## 2020-11-04 VITALS
HEART RATE: 82 BPM | DIASTOLIC BLOOD PRESSURE: 81 MMHG | WEIGHT: 131.19 LBS | HEIGHT: 63 IN | TEMPERATURE: 99 F | SYSTOLIC BLOOD PRESSURE: 127 MMHG | BODY MASS INDEX: 23.25 KG/M2

## 2020-11-04 VITALS
WEIGHT: 131 LBS | SYSTOLIC BLOOD PRESSURE: 110 MMHG | BODY MASS INDEX: 23.21 KG/M2 | HEIGHT: 63 IN | HEART RATE: 86 BPM | DIASTOLIC BLOOD PRESSURE: 60 MMHG

## 2020-11-04 DIAGNOSIS — R76.8 ANA POSITIVE: Primary | ICD-10-CM

## 2020-11-04 DIAGNOSIS — R07.9 CHEST PAIN, UNSPECIFIED TYPE: ICD-10-CM

## 2020-11-04 DIAGNOSIS — R76.8 ANA POSITIVE: ICD-10-CM

## 2020-11-04 DIAGNOSIS — R53.83 FATIGUE, UNSPECIFIED TYPE: ICD-10-CM

## 2020-11-04 DIAGNOSIS — R00.2 PALPITATIONS: ICD-10-CM

## 2020-11-04 DIAGNOSIS — R06.02 SHORTNESS OF BREATH: ICD-10-CM

## 2020-11-04 DIAGNOSIS — R21 RASH: ICD-10-CM

## 2020-11-04 DIAGNOSIS — M79.641 PAIN IN BOTH HANDS: ICD-10-CM

## 2020-11-04 DIAGNOSIS — M79.642 PAIN IN BOTH HANDS: ICD-10-CM

## 2020-11-04 LAB
ASCENDING AORTA: 2.39 CM
AV INDEX (PROSTH): 0.83
AV MEAN GRADIENT: 6 MMHG
AV PEAK GRADIENT: 10 MMHG
AV VALVE AREA: 2.61 CM2
AV VELOCITY RATIO: 0.84
BSA FOR ECHO PROCEDURE: 1.63 M2
C3 SERPL-MCNC: 112 MG/DL (ref 50–180)
C4 SERPL-MCNC: 31 MG/DL (ref 11–44)
CCP AB SER IA-ACNC: <0.5 U/ML
CK SERPL-CCNC: 97 U/L (ref 20–180)
CRP SERPL-MCNC: 0.7 MG/L (ref 0–8.2)
CV ECHO LV RWT: 0.4 CM
DAT IGG-SP REAG RBC-IMP: NORMAL
DOP CALC AO PEAK VEL: 1.58 M/S
DOP CALC AO VTI: 28.59 CM
DOP CALC LVOT AREA: 3.1 CM2
DOP CALC LVOT DIAMETER: 2 CM
DOP CALC LVOT PEAK VEL: 1.32 M/S
DOP CALC LVOT STROKE VOLUME: 74.51 CM3
DOP CALCLVOT PEAK VEL VTI: 23.73 CM
E WAVE DECELERATION TIME: 186.07 MSEC
E/A RATIO: 1.18
E/E' RATIO: 8 M/S
ECHO LV POSTERIOR WALL: 0.81 CM (ref 0.6–1.1)
ERYTHROCYTE [SEDIMENTATION RATE] IN BLOOD BY WESTERGREN METHOD: 6 MM/HR (ref 0–36)
FRACTIONAL SHORTENING: 40 % (ref 28–44)
HBV CORE AB SERPL QL IA: NEGATIVE
HBV SURFACE AB SER-ACNC: NEGATIVE M[IU]/ML
HBV SURFACE AG SERPL QL IA: NEGATIVE
HCV AB SERPL QL IA: NEGATIVE
HEPATITIS A ANTIBODY, IGG: POSITIVE
HIV 1+2 AB+HIV1 P24 AG SERPL QL IA: NEGATIVE
INTERVENTRICULAR SEPTUM: 0.89 CM (ref 0.6–1.1)
IVRT: 91.34 MSEC
LA MAJOR: 4.4 CM
LA MINOR: 4.35 CM
LA WIDTH: 3.65 CM
LEFT ATRIUM SIZE: 2.95 CM
LEFT ATRIUM VOLUME INDEX: 24.8 ML/M2
LEFT ATRIUM VOLUME: 40.04 CM3
LEFT INTERNAL DIMENSION IN SYSTOLE: 2.44 CM (ref 2.1–4)
LEFT VENTRICLE DIASTOLIC VOLUME INDEX: 45.35 ML/M2
LEFT VENTRICLE DIASTOLIC VOLUME: 73.26 ML
LEFT VENTRICLE MASS INDEX: 65 G/M2
LEFT VENTRICLE SYSTOLIC VOLUME INDEX: 13 ML/M2
LEFT VENTRICLE SYSTOLIC VOLUME: 21.08 ML
LEFT VENTRICULAR INTERNAL DIMENSION IN DIASTOLE: 4.08 CM (ref 3.5–6)
LEFT VENTRICULAR MASS: 104.75 G
LV LATERAL E/E' RATIO: 7.67 M/S
LV SEPTAL E/E' RATIO: 8.36 M/S
MV PEAK A VEL: 0.78 M/S
MV PEAK E VEL: 0.92 M/S
MV STENOSIS PRESSURE HALF TIME: 53.96 MS
MV VALVE AREA P 1/2 METHOD: 4.08 CM2
PISA TR MAX VEL: 2.08 M/S
PULM VEIN S/D RATIO: 1.32
PV PEAK D VEL: 0.44 M/S
PV PEAK S VEL: 0.58 M/S
RA MAJOR: 4.25 CM
RA PRESSURE: 3 MMHG
RA WIDTH: 2.74 CM
RHEUMATOID FACT SERPL-ACNC: <10 IU/ML (ref 0–15)
RIGHT VENTRICULAR END-DIASTOLIC DIMENSION: 2.63 CM
SINUS: 2.85 CM
STJ: 2.41 CM
TDI LATERAL: 0.12 M/S
TDI SEPTAL: 0.11 M/S
TDI: 0.12 M/S
TR MAX PG: 17 MMHG
TRICUSPID ANNULAR PLANE SYSTOLIC EXCURSION: 1.99 CM
TV REST PULMONARY ARTERY PRESSURE: 20 MMHG

## 2020-11-04 PROCEDURE — 86431 RHEUMATOID FACTOR QUANT: CPT

## 2020-11-04 PROCEDURE — 93306 ECHO (CUPID ONLY): ICD-10-PCS | Mod: 26,,, | Performed by: INTERNAL MEDICINE

## 2020-11-04 PROCEDURE — 86682 HELMINTH ANTIBODY: CPT

## 2020-11-04 PROCEDURE — 86140 C-REACTIVE PROTEIN: CPT

## 2020-11-04 PROCEDURE — 86880 COOMBS TEST DIRECT: CPT

## 2020-11-04 PROCEDURE — 82550 ASSAY OF CK (CPK): CPT

## 2020-11-04 PROCEDURE — 99999 PR PBB SHADOW E&M-EST. PATIENT-LVL III: CPT | Mod: PBBFAC,,, | Performed by: INTERNAL MEDICINE

## 2020-11-04 PROCEDURE — 86160 COMPLEMENT ANTIGEN: CPT | Mod: 59

## 2020-11-04 PROCEDURE — 86200 CCP ANTIBODY: CPT

## 2020-11-04 PROCEDURE — 86704 HEP B CORE ANTIBODY TOTAL: CPT

## 2020-11-04 PROCEDURE — 86160 COMPLEMENT ANTIGEN: CPT

## 2020-11-04 PROCEDURE — 86703 HIV-1/HIV-2 1 RESULT ANTBDY: CPT

## 2020-11-04 PROCEDURE — 99244 PR OFFICE CONSULTATION,LEVEL IV: ICD-10-PCS | Mod: S$GLB,,, | Performed by: INTERNAL MEDICINE

## 2020-11-04 PROCEDURE — 82085 ASSAY OF ALDOLASE: CPT

## 2020-11-04 PROCEDURE — 93306 TTE W/DOPPLER COMPLETE: CPT

## 2020-11-04 PROCEDURE — 86592 SYPHILIS TEST NON-TREP QUAL: CPT

## 2020-11-04 PROCEDURE — 85613 RUSSELL VIPER VENOM DILUTED: CPT

## 2020-11-04 PROCEDURE — 86146 BETA-2 GLYCOPROTEIN ANTIBODY: CPT

## 2020-11-04 PROCEDURE — 86225 DNA ANTIBODY NATIVE: CPT

## 2020-11-04 PROCEDURE — 99244 OFF/OP CNSLTJ NEW/EST MOD 40: CPT | Mod: S$GLB,,, | Performed by: INTERNAL MEDICINE

## 2020-11-04 PROCEDURE — 85652 RBC SED RATE AUTOMATED: CPT

## 2020-11-04 PROCEDURE — 86706 HEP B SURFACE ANTIBODY: CPT

## 2020-11-04 PROCEDURE — 86787 VARICELLA-ZOSTER ANTIBODY: CPT

## 2020-11-04 PROCEDURE — 87340 HEPATITIS B SURFACE AG IA: CPT

## 2020-11-04 PROCEDURE — 93306 TTE W/DOPPLER COMPLETE: CPT | Mod: 26,,, | Performed by: INTERNAL MEDICINE

## 2020-11-04 PROCEDURE — 86147 CARDIOLIPIN ANTIBODY EA IG: CPT

## 2020-11-04 PROCEDURE — 99999 PR PBB SHADOW E&M-EST. PATIENT-LVL III: ICD-10-PCS | Mod: PBBFAC,,, | Performed by: INTERNAL MEDICINE

## 2020-11-04 PROCEDURE — 86803 HEPATITIS C AB TEST: CPT

## 2020-11-04 PROCEDURE — 86790 VIRUS ANTIBODY NOS: CPT

## 2020-11-04 ASSESSMENT — ROUTINE ASSESSMENT OF PATIENT INDEX DATA (RAPID3)
FATIGUE SCORE: 7
AM STIFFNESS SCORE: 0, NO
PAIN SCORE: 7.5
TOTAL RAPID3 SCORE: 4.28
MDHAQ FUNCTION SCORE: 0.1
PATIENT GLOBAL ASSESSMENT SCORE: 5
PSYCHOLOGICAL DISTRESS SCORE: 2.2

## 2020-11-04 NOTE — LETTER
November 4, 2020      Zenaida Calix MD  [address]  [address]  [facility] [address]           [facility] [address]  [address]  [place] [address]  Phone: [phone]  Fax: [phone]          Patient: Deirdre Prado   MR Number: [id number]   YOB: 1985   Date of Visit: 11/4/2020       Dear Dr. Zenaida Calix:    Thank you for referring Deirdre Prado to me for evaluation. Attached you will find relevant portions of my assessment and plan of care.    If you have questions, please do not hesitate to call me. I look forward to following Deirdre Prado along with you.    Sincerely,    Julissa Dukes MD    Enclosure  CC:  No Recipients    If you would like to receive this communication electronically, please contact [email or web address] or [phone] to request more information on [facility] access.    For providers and/or their staff who would like to refer a patient to [facility], please contact us through our one-stop-shop provider referral line, [facility], at [phone].    If you feel you have received this communication in error or would no longer like to receive these types of communications, please e-mail [email or web address]

## 2020-11-04 NOTE — PROGRESS NOTES
"Subjective:       Patient ID: Deirdre Prado is a 35 y.o. female.    Chief Complaint: Positive EVGENY    HPI:  Deirdre Prado is a 35 y.o. female presents with  for positive EVGENY.  In May 2020 went to ER because when she would lay down to sleep palpitations and CP were severe and prevented her from lying.  ED told her she inflammation in chest bone.   She saw primary because palpitations increased HR was 140.  Labs were done.    Joint pain 8/10 ache hands, right knee and right foot.  Morning stiffness 5 minutes.     In 2019 developed CP and rapid heart rate that.    Mother with ovarian CA and maternal grandmother with breast CA.     Lupus Review of Systems  Alopecia: no  Photosensitivity: headaches  Raynaud's: no (cold causes pain in feet, numbness and red)  Oral or nasal ulcers: no  Rashes: no  No pleurisy or pericarditis.  No seizures, psychosis, or stroke.  No venous or arterial clots.  Pregnancy hx (if applicable): no miscarriages; 2 pregnancies full term; ectopic pregnancy        Review of Systems   Constitutional: Positive for fatigue.   HENT: Negative.    Eyes: Negative.    Cardiovascular: Positive for chest pain and palpitations.   Gastrointestinal: Negative.    Endocrine: Negative.    Genitourinary: Negative.    Musculoskeletal: Positive for arthralgias.   Skin: Positive for rash.   Allergic/Immunologic: Negative.    Neurological: Negative.    Hematological: Negative.    Psychiatric/Behavioral: Negative.          Objective:   /81 (BP Location: Right arm, Patient Position: Sitting, BP Method: Medium (Automatic))   Pulse 82   Temp 98.8 °F (37.1 °C) (Oral)   Ht 5' 3" (1.6 m)   Wt 59.5 kg (131 lb 2.8 oz)   LMP 10/12/2020   BMI 23.24 kg/m²      Physical Exam   Constitutional: She is oriented to person, place, and time and well-developed, well-nourished, and in no distress.   HENT:   Head: Normocephalic and atraumatic.   Eyes: Conjunctivae and EOM are normal.   Neck: Neck supple. "   Cardiovascular: Normal rate, regular rhythm and normal heart sounds.    Pulmonary/Chest: Effort normal and breath sounds normal.   Abdominal: Soft. Bowel sounds are normal.   Neurological: She is alert and oriented to person, place, and time. Gait normal.   Skin: Rash noted.     Malar and forehead   Psychiatric: Mood and affect normal.   Musculoskeletal:      Comments: 28 joint count: 0 swollen and 4 tender (4th and 5th MCP and PIP 3rd and 5th)            Assessment:       1.  Positive EVGENY  2.  CP   3.  Palpitations   4.  Fatigue  5.  Joint pains.  Hands, right knee and ankle    Plan:       1.  Additional labs  2.  Await Echo and holter  3.  Concern for lupus   RTO in 1 month/prn

## 2020-11-05 ENCOUNTER — PATIENT MESSAGE (OUTPATIENT)
Dept: FAMILY MEDICINE | Facility: CLINIC | Age: 35
End: 2020-11-05

## 2020-11-05 ENCOUNTER — PATIENT MESSAGE (OUTPATIENT)
Dept: RHEUMATOLOGY | Facility: CLINIC | Age: 35
End: 2020-11-05

## 2020-11-05 LAB
DSDNA AB SER-ACNC: NORMAL [IU]/ML
LA PPP-IMP: NEGATIVE
RPR SER QL: NORMAL
STRONGYLOIDES ANTIBODY IGG: NEGATIVE
VARICELLA INTERPRETATION: POSITIVE
VARICELLA ZOSTER IGG: 2.06 ISR (ref 0–0.9)

## 2020-11-06 LAB — ALDOLASE SERPL-CCNC: 2.3 U/L (ref 1.2–7.6)

## 2020-11-07 LAB
B2 GLYCOPROT1 IGA SER QL: <9 SAU
B2 GLYCOPROT1 IGG SER QL: <9 SGU
B2 GLYCOPROT1 IGM SER QL: <9 SMU

## 2020-11-09 LAB
CARDIOLIPIN IGG SER IA-ACNC: <9.4 GPL (ref 0–14.99)
CARDIOLIPIN IGM SER IA-ACNC: 10.6 MPL (ref 0–12.49)

## 2020-11-16 ENCOUNTER — PATIENT MESSAGE (OUTPATIENT)
Dept: RHEUMATOLOGY | Facility: CLINIC | Age: 35
End: 2020-11-16

## 2020-11-17 ENCOUNTER — PATIENT MESSAGE (OUTPATIENT)
Dept: RHEUMATOLOGY | Facility: CLINIC | Age: 35
End: 2020-11-17

## 2020-11-17 ENCOUNTER — TELEPHONE (OUTPATIENT)
Dept: RHEUMATOLOGY | Facility: CLINIC | Age: 35
End: 2020-11-17

## 2020-11-17 NOTE — TELEPHONE ENCOUNTER
----- Message from Alisa Madden MA sent at 11/17/2020  2:27 PM CST -----  Contact: pt  Patient  uses the portal also. #Alisa  ----- Message -----  From: iKt George  Sent: 11/17/2020   2:23 PM CST  To: [facility] Staff    Please call pt at [phone]    Patient is having hands & feet pain since last Thursday with no medication    Also would like to discuss her test results    Thank you

## 2020-11-18 ENCOUNTER — TELEPHONE (OUTPATIENT)
Dept: RHEUMATOLOGY | Facility: CLINIC | Age: 35
End: 2020-11-18

## 2020-11-18 ENCOUNTER — PATIENT MESSAGE (OUTPATIENT)
Dept: RHEUMATOLOGY | Facility: CLINIC | Age: 35
End: 2020-11-18

## 2020-11-18 NOTE — TELEPHONE ENCOUNTER
Spoke with patient and  (translated) on phone.  Patient has EVGENY positive but no other labs to suggest lupus.   Patient reports fingers were stiff and ashley horses in feet. Right hand was purple. Now with 9/10 pain in hands.  Improve with movement and massage.  Patient echo is normal.  She cancelled monitor for heart. Patient to take OTC NSAID Motrin 2 tabs twice daily up to 4 tabs daily.  Patient to reschedule heart monitor.  Will consider use of prednisone and Plaquenil after cardiac results obtained.  Patient will send update after Thanksgiving. Pain also in rib areas. Can alternate Motrin and Tylenol not to exceed amounts on bottle.

## 2020-11-18 NOTE — TELEPHONE ENCOUNTER
----- Message from Alisa Madden MA sent at 11/16/2020  8:46 AM CST -----  Regarding: FW: ADVISE  Contact: Self    ----- Message -----  From: Concepción Lopez  Sent: 11/16/2020   8:30 AM CST  To: [facility] Staff  Subject: ADVISE                                           Pt states she don't feel good. Pt states she have pain in her bones and cannot feel her hands Pt ask for a call w/ test results and have some questions Pt ask for a call back after 1:30p today due to will be off from work      Contact info

## 2020-11-25 ENCOUNTER — HOSPITAL ENCOUNTER (OUTPATIENT)
Dept: CARDIOLOGY | Facility: HOSPITAL | Age: 35
Discharge: HOME | End: 2020-11-25
Attending: INTERNAL MEDICINE
Payer: COMMERCIAL

## 2020-11-25 DIAGNOSIS — R00.2 PALPITATIONS: ICD-10-CM

## 2020-11-25 PROCEDURE — 93227 HOLTER MONITOR - 48 HOUR (CUPID ONLY): ICD-10-PCS | Mod: ,,, | Performed by: INTERNAL MEDICINE

## 2020-11-25 PROCEDURE — 93226 XTRNL ECG REC<48 HR SCAN A/R: CPT

## 2020-11-25 PROCEDURE — 93227 XTRNL ECG REC<48 HR R&I: CPT | Mod: ,,, | Performed by: INTERNAL MEDICINE

## 2020-11-30 ENCOUNTER — PATIENT MESSAGE (OUTPATIENT)
Dept: RHEUMATOLOGY | Facility: CLINIC | Age: 35
End: 2020-11-30

## 2020-11-30 ENCOUNTER — TELEPHONE (OUTPATIENT)
Dept: FAMILY MEDICINE | Facility: CLINIC | Age: 35
End: 2020-11-30

## 2020-11-30 DIAGNOSIS — R00.2 PALPITATIONS: Primary | ICD-10-CM

## 2020-11-30 LAB
OHS CV EVENT MONITOR DAY: 0
OHS CV HOLTER LENGTH DECIMAL HOURS: 48
OHS CV HOLTER LENGTH HOURS: 48
OHS CV HOLTER LENGTH MINUTES: 0

## 2020-12-01 ENCOUNTER — TELEPHONE (OUTPATIENT)
Dept: FAMILY MEDICINE | Facility: CLINIC | Age: 35
End: 2020-12-01

## 2020-12-01 ENCOUNTER — TELEPHONE (OUTPATIENT)
Dept: RHEUMATOLOGY | Facility: CLINIC | Age: 35
End: 2020-12-01

## 2020-12-08 ENCOUNTER — OFFICE VISIT (OUTPATIENT)
Dept: CARDIOLOGY | Facility: CLINIC | Age: 35
End: 2020-12-08
Payer: COMMERCIAL

## 2020-12-08 ENCOUNTER — PATIENT MESSAGE (OUTPATIENT)
Dept: RHEUMATOLOGY | Facility: CLINIC | Age: 35
End: 2020-12-08

## 2020-12-08 VITALS
SYSTOLIC BLOOD PRESSURE: 118 MMHG | HEART RATE: 91 BPM | OXYGEN SATURATION: 97 % | DIASTOLIC BLOOD PRESSURE: 70 MMHG | WEIGHT: 127.13 LBS | HEIGHT: 63 IN | BODY MASS INDEX: 22.53 KG/M2

## 2020-12-08 DIAGNOSIS — R00.2 PALPITATIONS: Primary | ICD-10-CM

## 2020-12-08 DIAGNOSIS — M94.0 COSTOCHONDRITIS: ICD-10-CM

## 2020-12-08 PROCEDURE — 93010 EKG 12-LEAD: ICD-10-PCS | Mod: S$GLB,,, | Performed by: INTERNAL MEDICINE

## 2020-12-08 PROCEDURE — 93005 EKG 12-LEAD: ICD-10-PCS | Mod: S$GLB,,, | Performed by: INTERNAL MEDICINE

## 2020-12-08 PROCEDURE — 1126F AMNT PAIN NOTED NONE PRSNT: CPT | Mod: S$GLB,,, | Performed by: INTERNAL MEDICINE

## 2020-12-08 PROCEDURE — 99999 PR PBB SHADOW E&M-EST. PATIENT-LVL III: CPT | Mod: PBBFAC,,, | Performed by: INTERNAL MEDICINE

## 2020-12-08 PROCEDURE — 93005 ELECTROCARDIOGRAM TRACING: CPT | Mod: S$GLB,,, | Performed by: INTERNAL MEDICINE

## 2020-12-08 PROCEDURE — 93010 ELECTROCARDIOGRAM REPORT: CPT | Mod: S$GLB,,, | Performed by: INTERNAL MEDICINE

## 2020-12-08 PROCEDURE — 1126F PR PAIN SEVERITY QUANTIFIED, NO PAIN PRESENT: ICD-10-PCS | Mod: S$GLB,,, | Performed by: INTERNAL MEDICINE

## 2020-12-08 PROCEDURE — 99204 PR OFFICE/OUTPT VISIT, NEW, LEVL IV, 45-59 MIN: ICD-10-PCS | Mod: S$GLB,,, | Performed by: INTERNAL MEDICINE

## 2020-12-08 PROCEDURE — 3008F PR BODY MASS INDEX (BMI) DOCUMENTED: ICD-10-PCS | Mod: CPTII,S$GLB,, | Performed by: INTERNAL MEDICINE

## 2020-12-08 PROCEDURE — 3008F BODY MASS INDEX DOCD: CPT | Mod: CPTII,S$GLB,, | Performed by: INTERNAL MEDICINE

## 2020-12-08 PROCEDURE — 99204 OFFICE O/P NEW MOD 45 MIN: CPT | Mod: S$GLB,,, | Performed by: INTERNAL MEDICINE

## 2020-12-08 PROCEDURE — 99999 PR PBB SHADOW E&M-EST. PATIENT-LVL III: ICD-10-PCS | Mod: PBBFAC,,, | Performed by: INTERNAL MEDICINE

## 2020-12-08 NOTE — PROGRESS NOTES
Subjective:    Patient ID:  Deirdre Prado is a 35 y.o. female who presents for evaluation of Palpitations      HPI    34 y/o English speaking female with hx of costochondritis, currently being worked up for possible autoimmune condition, who presents for evaluation of palpitations. Has been having multiple symptoms including palps she describes as her heart racing associated with substernal CP, mostly with moderate activity like working out. Has developed bilateral finger joint, right knee pain and inflammation. Seen by Rheum. Had normal 2DE and Holter unrevealing (reported symptoms correlated with sinus tach). Has SOB with episodes. Denies orthopnea, PND, syncope. Symptoms have been occurring about a year now. Drinks 1-2 cups of coffee per day. Former smoker, quit earlier this year (May). Social EtOH, does not exercise regularly anymore due to right knee pain, no fam hx of early CAD.     Review of Systems   Constitution: Negative for malaise/fatigue.   HENT: Negative for congestion.    Eyes: Negative for blurred vision.   Cardiovascular: Positive for chest pain and palpitations. Negative for claudication, cyanosis, dyspnea on exertion, irregular heartbeat, leg swelling, near-syncope, orthopnea, paroxysmal nocturnal dyspnea and syncope.   Respiratory: Positive for shortness of breath.    Endocrine: Negative for polyuria.   Hematologic/Lymphatic: Negative for bleeding problem.   Skin: Negative for itching and rash.   Musculoskeletal: Positive for joint pain and joint swelling. Negative for muscle cramps and muscle weakness.   Gastrointestinal: Negative for abdominal pain, hematemesis, hematochezia, melena, nausea and vomiting.   Genitourinary: Negative for dysuria and hematuria.   Neurological: Negative for dizziness, focal weakness, headaches, light-headedness, loss of balance and weakness.   Psychiatric/Behavioral: Negative for depression. The patient is not nervous/anxious.         Objective:    Physical Exam    Constitutional: She is oriented to person, place, and time. She appears well-developed and well-nourished.   HENT:   Head: Normocephalic and atraumatic.   Neck: Neck supple. No JVD present.   Cardiovascular: Normal rate, regular rhythm and normal heart sounds.   Pulses:       Carotid pulses are 2+ on the right side and 2+ on the left side.       Radial pulses are 2+ on the right side and 2+ on the left side.        Femoral pulses are 2+ on the right side and 2+ on the left side.       Dorsalis pedis pulses are 2+ on the right side and 2+ on the left side.        Posterior tibial pulses are 2+ on the right side and 2+ on the left side.   Pulmonary/Chest: Effort normal and breath sounds normal.   Abdominal: Soft. Bowel sounds are normal.   Musculoskeletal:         General: Edema present.   Neurological: She is alert and oriented to person, place, and time.   Skin: Skin is warm and dry.   Psychiatric: She has a normal mood and affect. Her behavior is normal. Thought content normal.         Assessment:       1. Palpitations    2. Costochondritis      34 y/o pt with hx and presentation as above. Doing well from a cardiac perspective and compensated from a HF perspective. 2DE and Holter reviewed and findings reassuring. Low risk for cardiac disease/event given age and lack of risk factors. CP likely costochondritis and palps likely result of pain. ECG in clinic today normal with NSR with sinus arrhythmia. No change to current management and OK for Rheum meds. Primary prevention of cardiac disease with exercise, healthy lifestyle. Discussed the etiology, evaluation, and management of CP, palps, costochondritis. Discussed the importance of med compliance, heart healthy diet, and regular exercise.      Plan:       -Continue current medical management  -OK for meds from a cardiac perspective   -f/u PRN

## 2020-12-08 NOTE — LETTER
December 8, 2020      Zenaida Calix MD  [address]  [address]  [place] [address]           [facility]  [address],   [facility] [address]  Phone: [phone]          Patient: Deirdre Prado   MR Number: [id number]   YOB: 1985   Date of Visit: 12/8/2020       Dear Dr. Zenaida Calix:    Thank you for referring Deirdre Prado to me for evaluation. Attached you will find relevant portions of my assessment and plan of care.    If you have questions, please do not hesitate to call me. I look forward to following Deirdre Prado along with you.    Sincerely,    Kayden Foreman MD    Enclosure  CC:  No Recipients    If you would like to receive this communication electronically, please contact [email or web address] or [phone] to request more information on [employer] Link access.    For providers and/or their staff who would like to refer a patient to [facility], please contact us through our one-stop-shop provider referral line, [facility], at [phone].    If you feel you have received this communication in error or would no longer like to receive these types of communications, please e-mail [email or web address]

## 2020-12-09 ENCOUNTER — PATIENT MESSAGE (OUTPATIENT)
Dept: RHEUMATOLOGY | Facility: CLINIC | Age: 35
End: 2020-12-09

## 2020-12-16 ENCOUNTER — OFFICE VISIT (OUTPATIENT)
Dept: RHEUMATOLOGY | Facility: CLINIC | Age: 35
End: 2020-12-16
Payer: COMMERCIAL

## 2020-12-16 ENCOUNTER — HOSPITAL ENCOUNTER (OUTPATIENT)
Dept: RADIOLOGY | Facility: HOSPITAL | Age: 35
Discharge: HOME | End: 2020-12-16
Attending: INTERNAL MEDICINE
Payer: COMMERCIAL

## 2020-12-16 DIAGNOSIS — R76.8 ANA POSITIVE: ICD-10-CM

## 2020-12-16 DIAGNOSIS — M79.641 PAIN IN BOTH HANDS: ICD-10-CM

## 2020-12-16 DIAGNOSIS — M79.672 BILATERAL FOOT PAIN: ICD-10-CM

## 2020-12-16 DIAGNOSIS — F41.9 ANXIETY: ICD-10-CM

## 2020-12-16 DIAGNOSIS — R53.83 FATIGUE, UNSPECIFIED TYPE: ICD-10-CM

## 2020-12-16 DIAGNOSIS — M94.0 COSTOCHONDRITIS: ICD-10-CM

## 2020-12-16 DIAGNOSIS — M79.642 PAIN IN BOTH HANDS: ICD-10-CM

## 2020-12-16 DIAGNOSIS — R21 RASH: ICD-10-CM

## 2020-12-16 DIAGNOSIS — M79.671 BILATERAL FOOT PAIN: ICD-10-CM

## 2020-12-16 DIAGNOSIS — R76.8 ANA POSITIVE: Primary | ICD-10-CM

## 2020-12-16 PROCEDURE — 77077 JOINT SURVEY SINGLE VIEW: CPT | Mod: 26,,, | Performed by: RADIOLOGY

## 2020-12-16 PROCEDURE — 77077 JOINT SURVEY SINGLE VIEW: CPT | Mod: TC

## 2020-12-16 PROCEDURE — 99214 PR OFFICE/OUTPT VISIT, EST, LEVL IV, 30-39 MIN: ICD-10-PCS | Mod: S$GLB,,, | Performed by: INTERNAL MEDICINE

## 2020-12-16 PROCEDURE — 99214 OFFICE O/P EST MOD 30 MIN: CPT | Mod: S$GLB,,, | Performed by: INTERNAL MEDICINE

## 2020-12-16 PROCEDURE — 99999 PR PBB SHADOW E&M-EST. PATIENT-LVL III: ICD-10-PCS | Mod: PBBFAC,,, | Performed by: INTERNAL MEDICINE

## 2020-12-16 PROCEDURE — 99999 PR PBB SHADOW E&M-EST. PATIENT-LVL III: CPT | Mod: PBBFAC,,, | Performed by: INTERNAL MEDICINE

## 2020-12-16 PROCEDURE — 77077 XR ARTHRITIS SURVEY: ICD-10-PCS | Mod: 26,,, | Performed by: RADIOLOGY

## 2020-12-16 RX ORDER — NAPROXEN 500 MG/1
500 TABLET ORAL 2 TIMES DAILY
Qty: 60 TABLET | Refills: 2 | Status: SHIPPED | OUTPATIENT
Start: 2020-12-16
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
## 2020-12-16 NOTE — PATIENT INSTRUCTIONS
Naproxen and naproxen sodium oral immediate-release tablets  What is this medicine?  NAPROXEN (na PROX en) is a non-steroidal anti-inflammatory drug (NSAID). It is used to reduce swelling and to treat pain. This medicine may be used for dental pain, headache, or painful monthly periods. It is also used for painful joint and muscular problems such as arthritis, tendinitis, bursitis, and gout.  How should I use this medicine?  Take this medicine by mouth with a glass of water. Follow the directions on the prescription label. Take it with food if your stomach gets upset. Try to not lie down for at least 10 minutes after you take it. Take your medicine at regular intervals. Do not take your medicine more often than directed. Long-term, continuous use may increase the risk of heart attack or stroke.  A special MedGuide will be given to you by the pharmacist with each prescription and refill. Be sure to read this information carefully each time.  Talk to your pediatrician regarding the use of this medicine in children. Special care may be needed.  What side effects may I notice from receiving this medicine?  Side effects that you should report to your doctor or health care professional as soon as possible:  · black or bloody stools, blood in the urine or vomit  · blurred vision  · chest pain  · difficulty breathing or wheezing  · nausea or vomiting  · severe stomach pain  · skin rash, skin redness, blistering or peeling skin, hives, or itching  · slurred speech or weakness on one side of the body  · swelling of eyelids, throat, lips  · unexplained weight gain or swelling  · unusually weak or tired  · yellowing of eyes or skin  Side effects that usually do not require medical attention (report to your doctor or health care professional if they continue or are bothersome):  · constipation  · headache  · heartburn  What may interact with this  medicine?  · alcohol  · aspirin  · cidofovir  · diuretics  · lithium  · methotrexate  · other drugs for inflammation like ketorolac or prednisone  · pemetrexed  · probenecid  · warfarin  What if I miss a dose?  If you miss a dose, take it as soon as you can. If it is almost time for your next dose, take only that dose. Do not take double or extra doses.  Where should I keep my medicine?  Keep out of the reach of children.  Store at room temperature between 15 and 30 degrees C (59 and 86 degrees F). Keep container tightly closed. Throw away any unused medicine after the expiration date.  What should I tell my health care provider before I take this medicine?  They need to know if you have any of these conditions:  · asthma  · cigarette smoker  · drink more than 3 alcohol containing drinks a day  · heart disease or circulation problems such as heart failure or leg edema (fluid retention)  · high blood pressure  · kidney disease  · liver disease  · stomach bleeding or ulcers  · an unusual or allergic reaction to naproxen, aspirin, other NSAIDs, other medicines, foods, dyes, or preservatives  · pregnant or trying to get pregnant  · breast-feeding  What should I watch for while using this medicine?  Tell your doctor or health care professional if your pain does not get better. Talk to your doctor before taking another medicine for pain. Do not treat yourself.  This medicine does not prevent heart attack or stroke. In fact, this medicine may increase the chance of a heart attack or stroke. The chance may increase with longer use of this medicine and in people who have heart disease. If you take aspirin to prevent heart attack or stroke, talk with your doctor or health care professional.  Do not take other medicines that contain aspirin, ibuprofen, or naproxen with this medicine. Side effects such as stomach upset, nausea, or ulcers may be more likely to occur. Many medicines available without a prescription should not be  taken with this medicine.  This medicine can cause ulcers and bleeding in the stomach and intestines at any time during treatment. Do not smoke cigarettes or drink alcohol. These increase irritation to your stomach and can make it more susceptible to damage from this medicine. Ulcers and bleeding can happen without warning symptoms and can cause death.  You may get drowsy or dizzy. Do not drive, use machinery, or do anything that needs mental alertness until you know how this medicine affects you. Do not stand or sit up quickly, especially if you are an older patient. This reduces the risk of dizzy or fainting spells.  This medicine can cause you to bleed more easily. Try to avoid damage to your teeth and gums when you brush or floss your teeth.  NOTE:This sheet is a summary. It may not cover all possible information. If you have questions about this medicine, talk to your doctor, pharmacist, or health care provider. Copyright© 2017 [employer]        Naproxen and naproxen sodium oral immediate-release tablets  What is this medicine?  NAPROXEN (na PROX en) is a non-steroidal anti-inflammatory drug (NSAID). It is used to reduce swelling and to treat pain. This medicine may be used for dental pain, headache, or painful monthly periods. It is also used for painful joint and muscular problems such as arthritis, tendinitis, bursitis, and gout.  How should I use this medicine?  Take this medicine by mouth with a glass of water. Follow the directions on the prescription label. Take it with food if your stomach gets upset. Try to not lie down for at least 10 minutes after you take it. Take your medicine at regular intervals. Do not take your medicine more often than directed. Long-term, continuous use may increase the risk of heart attack or stroke.  A special MedGuide will be given to you by the pharmacist with each prescription and refill. Be sure to read this information carefully each time.  Talk to your pediatrician  regarding the use of this medicine in children. Special care may be needed.  What side effects may I notice from receiving this medicine?  Side effects that you should report to your doctor or health care professional as soon as possible:  · black or bloody stools, blood in the urine or vomit  · blurred vision  · chest pain  · difficulty breathing or wheezing  · nausea or vomiting  · severe stomach pain  · skin rash, skin redness, blistering or peeling skin, hives, or itching  · slurred speech or weakness on one side of the body  · swelling of eyelids, throat, lips  · unexplained weight gain or swelling  · unusually weak or tired  · yellowing of eyes or skin  Side effects that usually do not require medical attention (report to your doctor or health care professional if they continue or are bothersome):  · constipation  · headache  · heartburn  What may interact with this medicine?  · alcohol  · aspirin  · cidofovir  · diuretics  · lithium  · methotrexate  · other drugs for inflammation like ketorolac or prednisone  · pemetrexed  · probenecid  · warfarin  What if I miss a dose?  If you miss a dose, take it as soon as you can. If it is almost time for your next dose, take only that dose. Do not take double or extra doses.  Where should I keep my medicine?  Keep out of the reach of children.  Store at room temperature between 15 and 30 degrees C (59 and 86 degrees F). Keep container tightly closed. Throw away any unused medicine after the expiration date.  What should I tell my health care provider before I take this medicine?  They need to know if you have any of these conditions:  · asthma  · cigarette smoker  · drink more than 3 alcohol containing drinks a day  · heart disease or circulation problems such as heart failure or leg edema (fluid retention)  · high blood pressure  · kidney disease  · liver disease  · stomach bleeding or ulcers  · an unusual or allergic reaction to naproxen, aspirin, other NSAIDs, other  medicines, foods, dyes, or preservatives  · pregnant or trying to get pregnant  · breast-feeding  What should I watch for while using this medicine?  Tell your doctor or health care professional if your pain does not get better. Talk to your doctor before taking another medicine for pain. Do not treat yourself.  This medicine does not prevent heart attack or stroke. In fact, this medicine may increase the chance of a heart attack or stroke. The chance may increase with longer use of this medicine and in people who have heart disease. If you take aspirin to prevent heart attack or stroke, talk with your doctor or health care professional.  Do not take other medicines that contain aspirin, ibuprofen, or naproxen with this medicine. Side effects such as stomach upset, nausea, or ulcers may be more likely to occur. Many medicines available without a prescription should not be taken with this medicine.  This medicine can cause ulcers and bleeding in the stomach and intestines at any time during treatment. Do not smoke cigarettes or drink alcohol. These increase irritation to your stomach and can make it more susceptible to damage from this medicine. Ulcers and bleeding can happen without warning symptoms and can cause death.  You may get drowsy or dizzy. Do not drive, use machinery, or do anything that needs mental alertness until you know how this medicine affects you. Do not stand or sit up quickly, especially if you are an older patient. This reduces the risk of dizzy or fainting spells.  This medicine can cause you to bleed more easily. Try to avoid damage to your teeth and gums when you brush or floss your teeth.  NOTE:This sheet is a summary. It may not cover all possible information. If you have questions about this medicine, talk to your doctor, pharmacist, or health care provider. Copyright© 2017 [employer] Standard

## 2021-02-11 ENCOUNTER — OFFICE VISIT (OUTPATIENT)
Dept: URGENT CARE | Facility: CLINIC | Age: 36
End: 2021-02-11
Payer: COMMERCIAL

## 2021-02-11 VITALS
RESPIRATION RATE: 16 BRPM | HEIGHT: 63 IN | BODY MASS INDEX: 22.32 KG/M2 | TEMPERATURE: 98 F | DIASTOLIC BLOOD PRESSURE: 92 MMHG | OXYGEN SATURATION: 98 % | WEIGHT: 126 LBS | SYSTOLIC BLOOD PRESSURE: 136 MMHG | HEART RATE: 78 BPM

## 2021-02-11 DIAGNOSIS — H60.501 ACUTE OTITIS EXTERNA OF RIGHT EAR, UNSPECIFIED TYPE: ICD-10-CM

## 2021-02-11 DIAGNOSIS — R09.82 PND (POST-NASAL DRIP): ICD-10-CM

## 2021-02-11 DIAGNOSIS — R09.81 CONGESTION OF PARANASAL SINUS: ICD-10-CM

## 2021-02-11 DIAGNOSIS — Z11.59 SCREENING FOR VIRAL DISEASE: ICD-10-CM

## 2021-02-11 DIAGNOSIS — J00 ACUTE NASOPHARYNGITIS: Primary | ICD-10-CM

## 2021-02-11 DIAGNOSIS — Z71.89 EDUCATED ABOUT COVID-19 VIRUS INFECTION: ICD-10-CM

## 2021-02-11 LAB
CTP QC/QA: YES
FLUAV AG NPH QL: NEGATIVE
FLUBV AG NPH QL: NEGATIVE
MOLECULAR STREP A: NEGATIVE
SARS-COV-2 RDRP RESP QL NAA+PROBE: NEGATIVE

## 2021-02-11 PROCEDURE — 87651 POCT STREP A MOLECULAR: ICD-10-PCS | Mod: QW,S$GLB,, | Performed by: PHYSICIAN ASSISTANT

## 2021-02-11 PROCEDURE — 87651 STREP A DNA AMP PROBE: CPT | Mod: QW,S$GLB,, | Performed by: PHYSICIAN ASSISTANT

## 2021-02-11 PROCEDURE — 3008F PR BODY MASS INDEX (BMI) DOCUMENTED: ICD-10-PCS | Mod: CPTII,S$GLB,, | Performed by: PHYSICIAN ASSISTANT

## 2021-02-11 PROCEDURE — 3008F BODY MASS INDEX DOCD: CPT | Mod: CPTII,S$GLB,, | Performed by: PHYSICIAN ASSISTANT

## 2021-02-11 PROCEDURE — U0002 COVID-19 LAB TEST NON-CDC: HCPCS | Mod: QW,S$GLB,, | Performed by: PHYSICIAN ASSISTANT

## 2021-02-11 PROCEDURE — U0002: ICD-10-PCS | Mod: QW,S$GLB,, | Performed by: PHYSICIAN ASSISTANT

## 2021-02-11 PROCEDURE — 99214 PR OFFICE/OUTPT VISIT, EST, LEVL IV, 30-39 MIN: ICD-10-PCS | Mod: 25,S$GLB,, | Performed by: PHYSICIAN ASSISTANT

## 2021-02-11 PROCEDURE — 87804 INFLUENZA ASSAY W/OPTIC: CPT | Mod: 59,QW,S$GLB, | Performed by: PHYSICIAN ASSISTANT

## 2021-02-11 PROCEDURE — 99214 OFFICE O/P EST MOD 30 MIN: CPT | Mod: 25,S$GLB,, | Performed by: PHYSICIAN ASSISTANT

## 2021-02-11 PROCEDURE — 87804 POCT INFLUENZA A/B: ICD-10-PCS | Mod: QW,S$GLB,, | Performed by: PHYSICIAN ASSISTANT

## 2021-02-11 RX ORDER — AZELASTINE 1 MG/ML
1 SPRAY, METERED NASAL 2 TIMES DAILY PRN
Qty: 30 ML | Refills: 0 | Status: SHIPPED | OUTPATIENT
Start: 2021-02-11 | End: 2023-11-09

## 2021-02-11 RX ORDER — OFLOXACIN 3 MG/ML
SOLUTION/ DROPS OPHTHALMIC
Qty: 30 ML | Refills: 0 | Status: SHIPPED | OUTPATIENT
Start: 2021-02-11 | End: 2023-11-09

## 2021-02-23 ENCOUNTER — PATIENT MESSAGE (OUTPATIENT)
Dept: RHEUMATOLOGY | Facility: CLINIC | Age: 36
End: 2021-02-23

## 2021-04-07 ENCOUNTER — OFFICE VISIT (OUTPATIENT)
Dept: OBSTETRICS AND GYNECOLOGY | Facility: CLINIC | Age: 36
End: 2021-04-07
Payer: COMMERCIAL

## 2021-04-07 VITALS
DIASTOLIC BLOOD PRESSURE: 62 MMHG | BODY MASS INDEX: 23.28 KG/M2 | SYSTOLIC BLOOD PRESSURE: 110 MMHG | WEIGHT: 131.38 LBS

## 2021-04-07 DIAGNOSIS — N89.8 VAGINAL DISCHARGE: Primary | ICD-10-CM

## 2021-04-07 DIAGNOSIS — F52.0 HYPOACTIVE SEXUAL DESIRE DISORDER: ICD-10-CM

## 2021-04-07 DIAGNOSIS — Z12.4 CERVICAL CANCER SCREENING: ICD-10-CM

## 2021-04-07 PROCEDURE — 88175 CYTOPATH C/V AUTO FLUID REDO: CPT | Performed by: OBSTETRICS & GYNECOLOGY

## 2021-04-07 PROCEDURE — 99999 PR PBB SHADOW E&M-EST. PATIENT-LVL II: ICD-10-PCS | Mod: PBBFAC,,, | Performed by: OBSTETRICS & GYNECOLOGY

## 2021-04-07 PROCEDURE — 87481 CANDIDA DNA AMP PROBE: CPT | Mod: 59 | Performed by: OBSTETRICS & GYNECOLOGY

## 2021-04-07 PROCEDURE — 1126F AMNT PAIN NOTED NONE PRSNT: CPT | Mod: S$GLB,,, | Performed by: OBSTETRICS & GYNECOLOGY

## 2021-04-07 PROCEDURE — 99395 PREV VISIT EST AGE 18-39: CPT | Mod: S$GLB,,, | Performed by: OBSTETRICS & GYNECOLOGY

## 2021-04-07 PROCEDURE — 99395 PR PREVENTIVE VISIT,EST,18-39: ICD-10-PCS | Mod: S$GLB,,, | Performed by: OBSTETRICS & GYNECOLOGY

## 2021-04-07 PROCEDURE — 87661 TRICHOMONAS VAGINALIS AMPLIF: CPT | Performed by: OBSTETRICS & GYNECOLOGY

## 2021-04-07 PROCEDURE — 1126F PR PAIN SEVERITY QUANTIFIED, NO PAIN PRESENT: ICD-10-PCS | Mod: S$GLB,,, | Performed by: OBSTETRICS & GYNECOLOGY

## 2021-04-07 PROCEDURE — 99999 PR PBB SHADOW E&M-EST. PATIENT-LVL II: CPT | Mod: PBBFAC,,, | Performed by: OBSTETRICS & GYNECOLOGY

## 2021-04-07 PROCEDURE — 3008F BODY MASS INDEX DOCD: CPT | Mod: CPTII,S$GLB,, | Performed by: OBSTETRICS & GYNECOLOGY

## 2021-04-07 PROCEDURE — 3008F PR BODY MASS INDEX (BMI) DOCUMENTED: ICD-10-PCS | Mod: CPTII,S$GLB,, | Performed by: OBSTETRICS & GYNECOLOGY

## 2021-04-07 RX ORDER — CLOTRIMAZOLE AND BETAMETHASONE DIPROPIONATE 10; .64 MG/G; MG/G
CREAM TOPICAL 2 TIMES DAILY
Qty: 15 G | Refills: 1 | Status: SHIPPED | OUTPATIENT
Start: 2021-04-07 | End: 2023-11-09

## 2021-04-07 RX ORDER — FLUCONAZOLE 150 MG
TABLET ORAL
Qty: 2 TABLET | Refills: 0 | Status: SHIPPED | OUTPATIENT
Start: 2021-04-07 | End: 2023-11-09

## 2021-04-09 LAB
C TRACH RRNA SPEC QL NAA+PROBE: NEGATIVE
N GONORRHOEA RRNA SPEC QL NAA+PROBE: NEGATIVE

## 2021-04-12 LAB
BACTERIAL VAGINOSIS DNA: POSITIVE
CANDIDA GLABRATA DNA: NEGATIVE
CANDIDA KRUSEI DNA: NEGATIVE
CANDIDA RRNA VAG QL PROBE: POSITIVE
T VAGINALIS RRNA GENITAL QL PROBE: NEGATIVE

## 2021-04-13 LAB
CLINICAL INFO: NORMAL
CYTO CVX: NORMAL
CYTOLOGIST CVX/VAG CYTO: NORMAL
CYTOLOGY CMNT CVX/VAG CYTO-IMP: NORMAL
CYTOLOGY PAP THIN PREP EXPLANATION: NORMAL
DATE OF PREVIOUS PAP: NORMAL
DATE PREVIOUS BX: NO
LMP START DATE: NORMAL
SPECIMEN SOURCE CVX/VAG CYTO: NORMAL
STAT OF ADQ CVX/VAG CYTO-IMP: NORMAL

## 2021-04-14 ENCOUNTER — TELEPHONE (OUTPATIENT)
Dept: OBSTETRICS AND GYNECOLOGY | Facility: HOSPITAL | Age: 36
End: 2021-04-14

## 2021-04-14 RX ORDER — METRONIDAZOLE 500 MG/1
500 TABLET ORAL EVERY 12 HOURS
Qty: 14 TABLET | Refills: 0 | Status: SHIPPED | OUTPATIENT
Start: 2021-04-14 | End: 2021-04-21

## 2021-04-14 RX ORDER — TERCONAZOLE 4 MG/G
1 CREAM VAGINAL NIGHTLY
Qty: 1 TUBE | Refills: 0 | Status: SHIPPED | OUTPATIENT
Start: 2021-04-14 | End: 2021-04-21

## 2022-01-10 ENCOUNTER — PATIENT MESSAGE (OUTPATIENT)
Dept: ADMINISTRATIVE | Facility: HOSPITAL | Age: 37
End: 2022-01-10
Payer: COMMERCIAL

## 2022-02-14 ENCOUNTER — OFFICE VISIT (OUTPATIENT)
Dept: OBSTETRICS AND GYNECOLOGY | Facility: CLINIC | Age: 37
End: 2022-02-14
Payer: COMMERCIAL

## 2022-02-14 VITALS — DIASTOLIC BLOOD PRESSURE: 68 MMHG | BODY MASS INDEX: 31.36 KG/M2 | SYSTOLIC BLOOD PRESSURE: 118 MMHG | WEIGHT: 177 LBS

## 2022-02-14 DIAGNOSIS — Z12.31 BREAST CANCER SCREENING BY MAMMOGRAM: Primary | ICD-10-CM

## 2022-02-14 DIAGNOSIS — Z01.419 WELL WOMAN EXAM: ICD-10-CM

## 2022-02-14 DIAGNOSIS — R30.0 DYSURIA: ICD-10-CM

## 2022-02-14 DIAGNOSIS — N76.0 VULVOVAGINITIS: ICD-10-CM

## 2022-02-14 DIAGNOSIS — Z11.3 SCREENING FOR STD (SEXUALLY TRANSMITTED DISEASE): ICD-10-CM

## 2022-02-14 PROCEDURE — 1160F RVW MEDS BY RX/DR IN RCRD: CPT | Mod: CPTII,S$GLB,, | Performed by: OBSTETRICS & GYNECOLOGY

## 2022-02-14 PROCEDURE — 99395 PR PREVENTIVE VISIT,EST,18-39: ICD-10-PCS | Mod: S$GLB,,, | Performed by: OBSTETRICS & GYNECOLOGY

## 2022-02-14 PROCEDURE — 3074F PR MOST RECENT SYSTOLIC BLOOD PRESSURE < 130 MM HG: ICD-10-PCS | Mod: CPTII,S$GLB,, | Performed by: OBSTETRICS & GYNECOLOGY

## 2022-02-14 PROCEDURE — 87481 CANDIDA DNA AMP PROBE: CPT | Mod: 59 | Performed by: OBSTETRICS & GYNECOLOGY

## 2022-02-14 PROCEDURE — 87491 CHLMYD TRACH DNA AMP PROBE: CPT | Mod: 59 | Performed by: OBSTETRICS & GYNECOLOGY

## 2022-02-14 PROCEDURE — 99999 PR PBB SHADOW E&M-EST. PATIENT-LVL III: CPT | Mod: PBBFAC,,, | Performed by: OBSTETRICS & GYNECOLOGY

## 2022-02-14 PROCEDURE — 3074F SYST BP LT 130 MM HG: CPT | Mod: CPTII,S$GLB,, | Performed by: OBSTETRICS & GYNECOLOGY

## 2022-02-14 PROCEDURE — 3078F DIAST BP <80 MM HG: CPT | Mod: CPTII,S$GLB,, | Performed by: OBSTETRICS & GYNECOLOGY

## 2022-02-14 PROCEDURE — 87591 N.GONORRHOEAE DNA AMP PROB: CPT | Mod: 59 | Performed by: OBSTETRICS & GYNECOLOGY

## 2022-02-14 PROCEDURE — 3008F PR BODY MASS INDEX (BMI) DOCUMENTED: ICD-10-PCS | Mod: CPTII,S$GLB,, | Performed by: OBSTETRICS & GYNECOLOGY

## 2022-02-14 PROCEDURE — 1159F PR MEDICATION LIST DOCUMENTED IN MEDICAL RECORD: ICD-10-PCS | Mod: CPTII,S$GLB,, | Performed by: OBSTETRICS & GYNECOLOGY

## 2022-02-14 PROCEDURE — 1160F PR REVIEW ALL MEDS BY PRESCRIBER/CLIN PHARMACIST DOCUMENTED: ICD-10-PCS | Mod: CPTII,S$GLB,, | Performed by: OBSTETRICS & GYNECOLOGY

## 2022-02-14 PROCEDURE — 3008F BODY MASS INDEX DOCD: CPT | Mod: CPTII,S$GLB,, | Performed by: OBSTETRICS & GYNECOLOGY

## 2022-02-14 PROCEDURE — 87086 URINE CULTURE/COLONY COUNT: CPT | Performed by: OBSTETRICS & GYNECOLOGY

## 2022-02-14 PROCEDURE — 99999 PR PBB SHADOW E&M-EST. PATIENT-LVL III: ICD-10-PCS | Mod: PBBFAC,,, | Performed by: OBSTETRICS & GYNECOLOGY

## 2022-02-14 PROCEDURE — 87801 DETECT AGNT MULT DNA AMPLI: CPT | Performed by: OBSTETRICS & GYNECOLOGY

## 2022-02-14 PROCEDURE — 3078F PR MOST RECENT DIASTOLIC BLOOD PRESSURE < 80 MM HG: ICD-10-PCS | Mod: CPTII,S$GLB,, | Performed by: OBSTETRICS & GYNECOLOGY

## 2022-02-14 PROCEDURE — 99395 PREV VISIT EST AGE 18-39: CPT | Mod: S$GLB,,, | Performed by: OBSTETRICS & GYNECOLOGY

## 2022-02-14 PROCEDURE — 1159F MED LIST DOCD IN RCRD: CPT | Mod: CPTII,S$GLB,, | Performed by: OBSTETRICS & GYNECOLOGY

## 2022-02-14 RX ORDER — TERCONAZOLE 4 MG/G
1 CREAM VAGINAL NIGHTLY
Qty: 1 EACH | Refills: 0 | Status: SHIPPED | OUTPATIENT
Start: 2022-02-14 | End: 2022-02-21

## 2022-02-15 ENCOUNTER — TELEPHONE (OUTPATIENT)
Dept: ADMINISTRATIVE | Facility: OTHER | Age: 37
End: 2022-02-15
Payer: COMMERCIAL

## 2022-02-15 RX ORDER — CLOTRIMAZOLE AND BETAMETHASONE DIPROPIONATE 10; .64 MG/G; MG/G
CREAM TOPICAL 2 TIMES DAILY
Qty: 15 G | Refills: 1 | Status: SHIPPED | OUTPATIENT
Start: 2022-02-15 | End: 2023-11-09

## 2022-02-15 NOTE — PROGRESS NOTES
GYNECOLOGY  :  Deirdre Prado is a 36 y.o.    Here today for  gyn evaluation  Refers vaginal discharge , itching and burning   Desires mammogram and breast examination, since mother had breast cancer   sexually active   No hx abnormal PAP or STD   S/p BTL       Past Medical History:   Diagnosis Date    Breast disorder     Depression     Mental disorder     Migraine      Past Surgical History:   Procedure Laterality Date    TUBAL LIGATION       Family History   Problem Relation Age of Onset    Cancer Mother     Breast cancer Mother     No Known Problems Father     Breast cancer Maternal Grandmother     Lupus Neg Hx     Rheum arthritis Neg Hx     Inflammatory bowel disease Neg Hx      Social History     Tobacco Use    Smoking status: Never Smoker    Smokeless tobacco: Never Used   Substance Use Topics    Alcohol use: Yes    Drug use: No     OB History    Para Term  AB Living   2 2 2     2   SAB IAB Ectopic Multiple Live Births           2      # Outcome Date GA Lbr Louie/2nd Weight Sex Delivery Anes PTL Lv   2 Term 13    F Vag-Spont   OLIVIER   1 Term 10    F Vag-Spont   OLIVIER       /68   Wt 80.3 kg (177 lb 0.5 oz)   LMP 2022 (Exact Date)   BMI 31.36 kg/m²     ROS  GENERAL: Denies significant weight gain or weight loss. Feeling well overall.  SKIN: Denies rash or lesions.  Normal skin turgor  HEAD: Denies head injury or headache.   NODES: Denies enlarged lymph nodes.   CHEST: Denies chest pain or shortness of breath.   CARDIOVASCULAR: Denies palpitations or left sided chest pain.   ABDOMEN: No abdominal pain,no  diarrhea,constipation  nausea, vomiting or rectal bleeding.   URINARY: normal  Frequency,no  Dysuria or burning on urination.   REPRODUCTIVE: Per HPI   BREASTS: The patient sometimes performs breast self-examination and denies pain, lumps, or nipple discharge.   HEMATOLOGIC: No easy bruisability or excessive bleeding.   MUSCULOSKELETAL: Denies joint  pain or swelling.   NEUROLOGIC: Denies syncope or weakness.   PSYCHIATRIC: Denies depression, anxiety or mood swings.    Physical Exam     Constitutional: She is oriented to person, place, and time. She appears well-developed and well-nourished. No distress.   HENT:   Head: Normocephalic.   NECK: Neck symmetric without masses or thyromegaly.  Cardiovascular: Normal rate and regular rhythm.   Pulmonary/Chest: Effort normal and breath sounds normal. No respiratory distress. She has no wheezes.   Breasts: Symmetrical, no skin changes or visible lesions. No palpable masses, nipple discharge or adenopathy bilaterally.  Abdominal: Soft not distended. Bowel sounds are normal. She exhibits   no masses . Suprapubic  tenderness to palpation.   Genitourinary: Pelvic exam was performed with patient supine.   External genitalia normal no lesions.Normal hair distribution   Adequate perineal body,normal urethral meatus   Vagina moist and well rugated without lesions,whitish vaginal  Discharge.   Cervix pink and without lesions. No bleeding. No significant cystocele or rectocele.  Bimanual exam showed uterus normal size, shape and position , mobile and nontender. Adnexa without masses or tenderness. Urethra and bladder normal  Extremities normal no cyanosis ,edema.     Procedures:  VAginal screen  GC/CT     A/P Deirdre Prado 36 y.o.     Dx=  1-vulvovaginitis   2--Cervical cancer screen  -Breast cancer screen   3- family Hx of Breast cancer     Plan:  Diflucan and lotrisone Rx   Mammogram , screening         Patient was counseled today on A.C.S. Pap guidelines and recommendations for yearly pelvic exams, mammograms and monthly self breast exams; to see her PCP for other health maintenance, nutrition and weight gain counseling, discussed lab values.  Discussed colonoscopy recommendations every 10 years starting at age 50   Calcium and vit D daily intake     F/u in 1 yr or PRN      I spent a total of 30 minutes on the day of  the visit.This includes face to face time and non-face to face time preparing to see the patient (eg, review of tests), Obtaining and/or reviewing separately obtained history, Documenting clinical information in the electronic or other health record, Independently interpreting resultsand communicating results to the patient/family/caregiver, or Care coordination.      Yayo Zamarripa M.D.   OB/GYN

## 2022-02-16 ENCOUNTER — TELEPHONE (OUTPATIENT)
Dept: OBSTETRICS AND GYNECOLOGY | Facility: CLINIC | Age: 37
End: 2022-02-16
Payer: COMMERCIAL

## 2022-02-16 ENCOUNTER — HOSPITAL ENCOUNTER (OUTPATIENT)
Dept: RADIOLOGY | Facility: HOSPITAL | Age: 37
Discharge: HOME | End: 2022-02-16
Attending: OBSTETRICS & GYNECOLOGY
Payer: COMMERCIAL

## 2022-02-16 DIAGNOSIS — Z12.31 BREAST CANCER SCREENING BY MAMMOGRAM: ICD-10-CM

## 2022-02-16 DIAGNOSIS — Z12.31 BREAST CANCER SCREENING BY MAMMOGRAM: Primary | ICD-10-CM

## 2022-02-16 LAB — BACTERIA UR CULT: NORMAL

## 2022-02-16 NOTE — TELEPHONE ENCOUNTER
----- Message from Dustin Warner LPN sent at 2/16/2022  3:02 PM CST -----  Regarding: Diganstic mammogram  Patient was scheduled for a screening mammogram today, but she has inverted nipples and we are unable to do her as a screening mammogram. Due to her having breast symptoms Please enter an order for a bilateral diagnostic mammogram and I can schedule the patient for an appointment at the breast center.  Thank you,  Dustin Warner LPN  Breast Imaging   [phone]

## 2022-02-17 ENCOUNTER — HOSPITAL ENCOUNTER (OUTPATIENT)
Dept: RADIOLOGY | Facility: HOSPITAL | Age: 37
Discharge: HOME | End: 2022-02-17
Attending: OBSTETRICS & GYNECOLOGY
Payer: COMMERCIAL

## 2022-02-17 ENCOUNTER — PATIENT MESSAGE (OUTPATIENT)
Dept: FAMILY MEDICINE | Facility: CLINIC | Age: 37
End: 2022-02-17
Payer: COMMERCIAL

## 2022-02-17 VITALS — WEIGHT: 130 LBS | BODY MASS INDEX: 23.03 KG/M2

## 2022-02-17 DIAGNOSIS — R92.8 ABNORMAL FINDING ON BREAST IMAGING: ICD-10-CM

## 2022-02-17 DIAGNOSIS — Z12.31 BREAST CANCER SCREENING BY MAMMOGRAM: ICD-10-CM

## 2022-02-17 DIAGNOSIS — N64.59 INVERSION OF LEFT NIPPLE: ICD-10-CM

## 2022-02-17 PROCEDURE — 77062 BREAST TOMOSYNTHESIS BI: CPT | Mod: 26,,, | Performed by: RADIOLOGY

## 2022-02-17 PROCEDURE — 77062 MAMMO DIGITAL DIAGNOSTIC BILAT WITH TOMO: ICD-10-PCS | Mod: 26,,, | Performed by: RADIOLOGY

## 2022-02-17 PROCEDURE — 76642 ULTRASOUND BREAST LIMITED: CPT | Mod: 26,,, | Performed by: RADIOLOGY

## 2022-02-17 PROCEDURE — 76642 ULTRASOUND BREAST LIMITED: CPT | Mod: TC,50

## 2022-02-17 PROCEDURE — 77066 DX MAMMO INCL CAD BI: CPT | Mod: 26,,, | Performed by: RADIOLOGY

## 2022-02-17 PROCEDURE — 77062 BREAST TOMOSYNTHESIS BI: CPT | Mod: TC

## 2022-02-17 PROCEDURE — 76642 US BREAST BILATERAL LIMITED: ICD-10-PCS | Mod: 26,,, | Performed by: RADIOLOGY

## 2022-02-17 PROCEDURE — 77066 MAMMO DIGITAL DIAGNOSTIC BILAT WITH TOMO: ICD-10-PCS | Mod: 26,,, | Performed by: RADIOLOGY

## 2022-02-17 NOTE — TELEPHONE ENCOUNTER
----- Message from Yayo Zamarripa MD sent at 2/16/2022 10:02 PM CST -----  Urine culture  negative       Yayo Zamarripa M.D.   OB/GYN

## 2022-02-21 LAB
C TRACH DNA SPEC QL NAA+PROBE: NOT DETECTED
N GONORRHOEA DNA SPEC QL NAA+PROBE: NOT DETECTED

## 2022-02-23 ENCOUNTER — PATIENT MESSAGE (OUTPATIENT)
Dept: OBSTETRICS AND GYNECOLOGY | Facility: CLINIC | Age: 37
End: 2022-02-23
Payer: COMMERCIAL

## 2022-05-11 ENCOUNTER — OFFICE VISIT (OUTPATIENT)
Dept: URGENT CARE | Facility: CLINIC | Age: 37
End: 2022-05-11
Payer: COMMERCIAL

## 2022-05-11 VITALS
TEMPERATURE: 99 F | HEART RATE: 93 BPM | WEIGHT: 135 LBS | OXYGEN SATURATION: 98 % | HEIGHT: 61 IN | DIASTOLIC BLOOD PRESSURE: 86 MMHG | BODY MASS INDEX: 25.49 KG/M2 | SYSTOLIC BLOOD PRESSURE: 137 MMHG | RESPIRATION RATE: 20 BRPM

## 2022-05-11 DIAGNOSIS — J02.9 SORE THROAT: ICD-10-CM

## 2022-05-11 DIAGNOSIS — U07.1 COVID-19: Primary | ICD-10-CM

## 2022-05-11 LAB
CTP QC/QA: YES
SARS-COV-2 RDRP RESP QL NAA+PROBE: POSITIVE

## 2022-05-11 PROCEDURE — 1159F PR MEDICATION LIST DOCUMENTED IN MEDICAL RECORD: ICD-10-PCS | Mod: CPTII,S$GLB,, | Performed by: PHYSICIAN ASSISTANT

## 2022-05-11 PROCEDURE — 1159F MED LIST DOCD IN RCRD: CPT | Mod: CPTII,S$GLB,, | Performed by: PHYSICIAN ASSISTANT

## 2022-05-11 PROCEDURE — 1160F RVW MEDS BY RX/DR IN RCRD: CPT | Mod: CPTII,S$GLB,, | Performed by: PHYSICIAN ASSISTANT

## 2022-05-11 PROCEDURE — U0002 COVID-19 LAB TEST NON-CDC: HCPCS | Mod: QW,S$GLB,, | Performed by: PHYSICIAN ASSISTANT

## 2022-05-11 PROCEDURE — 99213 OFFICE O/P EST LOW 20 MIN: CPT | Mod: S$GLB,,, | Performed by: PHYSICIAN ASSISTANT

## 2022-05-11 PROCEDURE — 3075F PR MOST RECENT SYSTOLIC BLOOD PRESS GE 130-139MM HG: ICD-10-PCS | Mod: CPTII,S$GLB,, | Performed by: PHYSICIAN ASSISTANT

## 2022-05-11 PROCEDURE — 99213 PR OFFICE/OUTPT VISIT, EST, LEVL III, 20-29 MIN: ICD-10-PCS | Mod: S$GLB,,, | Performed by: PHYSICIAN ASSISTANT

## 2022-05-11 PROCEDURE — 3075F SYST BP GE 130 - 139MM HG: CPT | Mod: CPTII,S$GLB,, | Performed by: PHYSICIAN ASSISTANT

## 2022-05-11 PROCEDURE — 3008F PR BODY MASS INDEX (BMI) DOCUMENTED: ICD-10-PCS | Mod: CPTII,S$GLB,, | Performed by: PHYSICIAN ASSISTANT

## 2022-05-11 PROCEDURE — 3008F BODY MASS INDEX DOCD: CPT | Mod: CPTII,S$GLB,, | Performed by: PHYSICIAN ASSISTANT

## 2022-05-11 PROCEDURE — U0002: ICD-10-PCS | Mod: QW,S$GLB,, | Performed by: PHYSICIAN ASSISTANT

## 2022-05-11 PROCEDURE — 3079F PR MOST RECENT DIASTOLIC BLOOD PRESSURE 80-89 MM HG: ICD-10-PCS | Mod: CPTII,S$GLB,, | Performed by: PHYSICIAN ASSISTANT

## 2022-05-11 PROCEDURE — 1160F PR REVIEW ALL MEDS BY PRESCRIBER/CLIN PHARMACIST DOCUMENTED: ICD-10-PCS | Mod: CPTII,S$GLB,, | Performed by: PHYSICIAN ASSISTANT

## 2022-05-11 PROCEDURE — 3079F DIAST BP 80-89 MM HG: CPT | Mod: CPTII,S$GLB,, | Performed by: PHYSICIAN ASSISTANT

## 2022-05-11 NOTE — LETTER
[address]  [place] [address]  Phone: [phone]  Fax: [phone]          Return to Work/School    Patient: Deirdre Prado  YOB: 1985   Date: 05/11/2022     To Whom It May Concern:     Deirdre Prado was in contact with/seen in my office on 05/11/2022. COVID-19 is present in our communities across the [facility]. There is limited testing for COVID at this time, so not all patients can be tested. In this situation, your employee meets the following criteria:     Deirdre Prado has met the criteria for COVID-19 testing and has a POSITIVE result. She can return to work once they are asymptomatic for 24 hours without the use of fever reducing medications AND at least five days from the start of symptoms (or from the first positive result if they have no symptoms). Isolation ends 5/13/2022.     If you have any questions or concerns, or if I can be of further assistance, please do not hesitate to contact me.     Sincerely,    Malathi Patton PA-C

## 2022-05-11 NOTE — PROGRESS NOTES
"Subjective:       Patient ID: Deirdre Prado is a 37 y.o. female.    Vitals:  height is 5' 1" (1.549 m) and weight is 61.2 kg (135 lb). Her oral temperature is 99 °F (37.2 °C). Her blood pressure is 137/86 and her pulse is 93. Her respiration is 20 and oxygen saturation is 98%.     Chief Complaint: Sore Throat ((B) earache)    Pt explain that her symptoms began on Saturday and she used otc meds but no relief and she would liked to be tested for covid in today visited.    Patient provider note starts here:  Patient presents with complaints of a sore throat, body aches, cough which she states began on 5/7.  Reports that her daughter has been ill with similar symptoms as well.  She has taken Tylenol and Motrin for her symptoms with mild relief.  She has been fully vaccinated against COVID.  Denies fever, chest pain, shortness of breath.    Sore Throat   This is a new problem. The current episode started in the past 7 days. The problem has been unchanged. There has been no fever. The fever has been present for less than 1 day. The pain is at a severity of 7/10. The pain is moderate. Associated symptoms include coughing, ear pain, headaches, a hoarse voice and trouble swallowing. Pertinent negatives include no abdominal pain, congestion, diarrhea, ear discharge, neck pain, shortness of breath or vomiting. Treatments tried: Dayquil/Nyquil. The treatment provided no relief.       Constitution: Positive for sweating. Negative for fever.   HENT: Positive for ear pain, sore throat and trouble swallowing. Negative for ear discharge, congestion and postnasal drip.    Neck: Negative for neck pain.   Cardiovascular: Negative for chest pain, palpitations and sob on exertion.   Respiratory: Positive for cough. Negative for chest tightness, sputum production, shortness of breath and wheezing.    Gastrointestinal: Negative for abdominal pain, vomiting and diarrhea.   Skin: Negative for color change and wound.   Neurological: " Positive for headaches. Negative for numbness and tingling.       Objective:      Physical Exam   Constitutional: She is oriented to person, place, and time. She appears well-developed. She is cooperative.  Non-toxic appearance. She does not appear ill. No distress.   HENT:   Head: Normocephalic and atraumatic.   Ears:   Right Ear: Hearing, tympanic membrane, external ear and ear canal normal.   Left Ear: Hearing, tympanic membrane, external ear and ear canal normal.   Nose: Nose normal. No mucosal edema, rhinorrhea or nasal deformity. No epistaxis. Right sinus exhibits no maxillary sinus tenderness and no frontal sinus tenderness. Left sinus exhibits no maxillary sinus tenderness and no frontal sinus tenderness.   Mouth/Throat: Uvula is midline and mucous membranes are normal. No trismus in the jaw. Normal dentition. No uvula swelling. Posterior oropharyngeal erythema present. No oropharyngeal exudate or posterior oropharyngeal edema.   Eyes: Conjunctivae and lids are normal. No scleral icterus.   Neck: Trachea normal and phonation normal. Neck supple. No edema present. No erythema present. No neck rigidity present.   Cardiovascular: Normal rate, regular rhythm, normal heart sounds and normal pulses.   Pulmonary/Chest: Effort normal and breath sounds normal. No respiratory distress. She has no decreased breath sounds. She has no wheezes. She has no rhonchi.   Abdominal: Normal appearance.   Musculoskeletal: Normal range of motion.         General: No deformity. Normal range of motion.   Neurological: She is alert and oriented to person, place, and time. She exhibits normal muscle tone. Coordination normal.   Skin: Skin is warm, dry, intact, not diaphoretic and not pale.   Psychiatric: Her speech is normal and behavior is normal. Judgment and thought content normal.   Nursing note and vitals reviewed.        Assessment:       1. COVID-19    2. Sore throat        Results for orders placed or performed in visit on  05/11/22   POCT COVID-19 Rapid Screening   Result Value Ref Range    POC Rapid COVID Positive (A) Negative     Acceptable Yes        Plan:         COVID-19    Sore throat  -     POCT COVID-19 Rapid Screening           Medical Decision Making:   History:   Old Medical Records: I decided to obtain old medical records.  Old Records Summarized: records from clinic visits.       <> Summary of Records: History of COVID 6/28/2020  Differential Diagnosis:   Differential diagnosis includes but is not limited to: viral vs bacterial URI, pharyngitis, otitis, COVID 19, influenza, pneumonia.    Clinical Tests:   Lab Tests: Ordered and Reviewed       <> Summary of Lab: COVID+  Urgent Care Management:  Patient's COVID risk score: 0  Patient presents with complaints of URI like symptoms for the past few days.  On exam, she is afebrile and nontoxic appearing.  She has tested positive for COVID-19.  Lungs are clear to auscultation bilaterally and vital signs are stable.  She was advised symptomatic treatment, ED precautions discussed, isolation requirements discussed as well.  She has a COVID risk score of 0. She verbalized understanding and agreed with plan.       Patient Instructions   You have tested positive for COVID-19 today.  Per the CDC, you are now in a 5 day isolation.    This isolation starts from the day you first developed symptoms, not the day of your positive test. For example, if your symptoms began on a Monday, and you waited until Friday of the same week to get tested, and it was positive, your 5 day isolation begins from that Monday, not the Friday you tested positive.    However, if you are asymptomatic (a person who does not have any symptoms), and received a COVID-19 test that was positive, your 5 day isolation begins on the day you tested positive.  This is regardless if you were exposed to a known positive days earlier.  So for example, if you test positive as an asymptomatic on day 7 from  exposure, you have now extended your 5 day quarantine.    In order to return to activities of daily living per the CDC guidelines, you can be around other after: 5 days since symptoms first appeared, 24 hours with no fever without the use of fever-reducing medications, and other symptoms (besides loss of taste or smell) must be improving.  Once you meet all these requirements you may return to your normal activities including social distancing, wearing masks, and frequent handwashing - YOU DO NOT NEED ANOTHER TEST, OR TO TEST NEGATIVE, IN ORDER TO END YOUR QUARANTINE!    Anyone who has been exposed to you since 2 days before your symptoms started should follow CDC guidelines for quarantine.  The CDC recommends quarantine if you have been in close contact (within 6 feet of someone for a cumulative total of 15 minutes or more over a 24-hour period) with someone who has COVID-19, unless you have been fully vaccinated. People who are fully vaccinated do NOT need to quarantine after contact with someone who had COVID-19 unless they have symptoms. However, fully vaccinated people should get tested 3-5 days after their exposure, even they dont have symptoms and wear a mask indoors in public for 10 days following exposure or until their test result is negative.  Individuals are recommended to stay home for 5 days after your last contact with a person who has COVID-19.  Watch for symptoms of COVID-19.  If possible, stay away from people you live with, especially people who are at higher risk for getting very sick from COVID-19.  If you'd like to end your quarantine early, your options are as follows.  You may end your quarantine on day 7 IF you have a negative covid test at least 5 days from last known exposure with no covid symptoms by day 7.  Or you may end quarantine on day with no test and no covid symptoms.    Important home care recommendations include: monitor your symptoms, if you have trouble breathing please go to  the ER. Stay in a separate room from other household members, if possible.  Use a separate bathroom, if possible.  Avoid contact with other members of the household and pets.  Don't share personal household items, like cups, towels, and utensils.  Wear a mask when around other people if able.  Please refer to CDC's websit for more information about what to do if you you're sick and how to notify your contacts.    Stay home except to get medical care. Most people with COVID-19 have mild illness and can recover at home without medical care. Do not leave your home, except to get medical care. Do not visit public areas. Get rest and stay hydrated. Call before you get medical care. Be sure to get care if you have trouble breathing, or have any other emergency warning signs, or if you think it is an emergency.  Avoid public transportation, ride-sharing, or taxis.  Separate yourself from other people.  As much as possible, stay in a specific room and away from other people and pets in your home. If possible, you should use a separate bathroom. If you need to be around other people or animals in or outside of the home, wear a mask.    Tell your close contacts that they may have been exposed to COVID-19. An infected person can spread COVID-19 starting 48 hours (or 2 days) before the person has any symptoms or tests positive. By letting your close contacts know they may have been exposed to COVID-19, you are helping to protect everyone.    Additional guidance is available for those living in close quarters and shared housing on CDC's web site.  Please see COVID-19 and Animals if you have questions about pets.  If you are diagnosed with COVID-19, someone from the health department may call you. Answer the call to slow the spread.  Look for emergency warning signs* for COVID-19. If someone is showing any of these signs, seek emergency medical care immediately:  Trouble breathing  Persistent pain or pressure in the chest  New  confusion  Inability to wake or stay awake  Pale, gray, or blue-colored skin, lips, or nail beds, depending on skin tone  *This list is not all possible symptoms. Please call your medical provider for any other symptoms that are severe or concerning to you.    Call ahead before visiting your doctor  Call ahead. Many medical visits for routine care are being postponed or done by phone or telemedicine.  If you have a medical appointment that cannot be postponed, call your doctors office, and tell them you have or may have COVID-19. This will help the office protect themselves and other patients.    You dont need to wear the mask if you are alone. If you cant put on a mask (because of trouble breathing, for example), cover your coughs and sneezes in some other way. Try to stay at least 6 feet away from other people. This will help protect the people around you.  Masks should not be placed on young children under age 2 years, anyone who has trouble breathing, or anyone who is not able to remove the mask without help.    Cover your coughs and sneezes  Cover your mouth and nose with a tissue when you cough or sneeze.  Throw away used tissues in a lined trash can.  Immediately wash your hands with soap and water for at least 20 seconds. If soap and water are not available, clean your hands with an alcohol-based hand  that contains at least 60% alcohol.  hands wash light icon  Clean your hands often  Wash your hands often with soap and water for at least 20 seconds. This is especially important after blowing your nose, coughing, or sneezing; going to the bathroom; and before eating or preparing food.  Use hand  if soap and water are not available. Use an alcohol-based hand  with at least 60% alcohol, covering all surfaces of your hands and rubbing them together until they feel dry.  Soap and water are the best option, especially if hands are visibly dirty.  Avoid touching your eyes, nose, and  mouth with unwashed hands.  Handwashing Tips  Avoid sharing personal household items  Do not share dishes, drinking glasses, cups, eating utensils, towels, or bedding with other people in your home.  Wash these items thoroughly after using them with soap and water or put in the .  spraybottle icon  Clean all high-touch surfaces everyday  Clean and disinfect high-touch surfaces in your sick room and bathroom; wear disposable gloves. Let someone else clean and disinfect surfaces in common areas, but you should clean your bedroom and bathroom, if possible.  If a caregiver or other person needs to clean and disinfect a sick persons bedroom or bathroom, they should do so on an as-needed basis. The caregiver/other person should wear a mask and disposable gloves prior to cleaning. They should wait as long as possible after the person who is sick has used the bathroom before coming in to clean and use the bathroom.  High-touch surfaces include phones, remote controls, counters, tabletops, doorknobs, bathroom fixtures, toilets, keyboards, tablets, and bedside tables.    Clean and disinfect areas that may have blood, stool, or body fluids on them.  Use household  and disinfectants. Clean the area or item with soap and water or another detergent if it is dirty. Then, use a household disinfectant.  Be sure to follow the instructions on the label to ensure safe and effective use of the product. Many products recommend keeping the surface wet for several minutes to ensure germs are killed. Many also recommend precautions such as wearing gloves and making sure you have good ventilation during use of the product.  Use a product from EPAs List N: Disinfectants for Coronavirus (COVID-19)external icon.  Complete Disinfection Guidance      PLEASE READ YOUR DISCHARGE INSTRUCTIONS ENTIRELY AS IT CONTAINS IMPORTANT INFORMATION.    Please drink plenty of fluids.    Please get plenty of rest.    If not allergic,  please take over the counter Tylenol (Acetaminophen) and/or Motrin (Ibuprofen) as directed for control of muscle aches, pain, and/or fever.    Please go to the Emergency Department for any shortness of breath or difficulty breathing.    For congestion, runny nose, or post nasal drip please take an over the counter antihistamine medication (Claritin/Zyrtec/Allegra) of your choice as directed or try an over the counter decongestant like Sudafed. You buy this behind the pharmacy counter.  You can also buy combination OTC antihistamine plus decongestant medications such at Zyrtec-D or Claritin-D.  Do not take decongestant if you suffer from high blood pressure.    For cough, you may be prescribed medication.  Take as prescribed.  If you were not prescribed any medication, you can use over the counter cough medications such as Robitussin.  If you have chest congestion you can consider using over the counter Mucinex but make sure you drink plenty of water to encourage mobilization of the secretions.    If you do have high blood pressure or palpitations, it is safe to take Coricidin HBP for relief of sinus symptoms.    Sore throat recommendations: Warm fluids (tea with honey, soup, broth), warm salt water gargles, throat lozenges, rest, hydration.    If you  smoke, please stop smoking.    You must understand that you've received an Urgent Care treatment only and that you may be released before all of your medical problems are known or treated. You, the patient, will arrange for follow up as instructed. If your symptoms worsen or fail to improve you should go to the Emergency Room.     Your test was POSITIVE for COVID-19 (coronavirus).       Please isolate yourself at home.  You may leave home and/or return to work once the following conditions are met:    If you were not hospitalized and are not moderately to severely immunocompromised:    More than 5 days since symptoms first appeared AND   More than 24 hours fever free  without medications AND   Symptoms are improving   Continue to wear a mask around others for 5 additional days.    If you were hospitalized OR are moderately to severely immunocompromised:   More than 20 days since symptoms first appeared   More than 24 hours fever free without medications   Symptoms have improved    If you had no symptoms but tested positive:   More than 5 days since the date of the first positive test (20 days if moderately to severely immunocompromised). If you develop symptoms, then use the guidelines above.   Continue to wear a mask around others for 5 additional days.      Contact Tracing    As one of the next steps, you will receive a call or text from the [place] Department of Health ([facility]) COVID-19 Tracing Team. See the contact information below so you know not to ignore the health departments call. It is important that you contact them back immediately so they can help.      Contact Tracer Number:  [phone]  Caller ID for most carriers: [facility]     What is contact tracing?  · Contact tracing is a process that helps identify everyone who has been in close contact with an infected person. Contact tracers let those people know they may have been exposed and guide them on next steps. Confidentiality is important for everyone; no one will be told who may have exposed them to the virus.  · Your involvement is important. The more we know about where and how this virus is spreading, the better chance we have at stopping it from spreading further.  What does exposure mean?  · Exposure means you have been within 6 feet for more than 15 minutes with a person who has or had COVID-19.  What kind of questions do the contact tracers ask?  · A contact tracer will confirm your basic contact information including name, address, phone number, and next of kin, as well as asking about any symptoms you may have had. Theyll also ask you how you think you may have gotten sick, such as  places where you may have been exposed to the virus, and people you were with. Those names will never be shared with anyone outside of that call, and will only be used to help trace and stop the spread of the virus.   I have privacy concerns. How will the state use my information?  · Your privacy about your health is important. All calls are completed using call centers that use the appropriate health privacy protection measures (HIPAA compliance), meaning that your patient information is safe. No one will ever ask you any questions related to immigration status. Your health comes first.   Do I have to participate?  · You do not have to participate, but we strongly encourage you to. Contact tracing can help us catch and control new outbreaks as theyre developing to keep your friends and family safe.   What if I dont hear from anyone?  · If you dont receive a call within 24 hours, you can call the number above right away to inquire about your status. That line is open from 8:00 am - 8:00 p.m., 7 days a week.  Contact tracing saves lives! Together, we have the power to beat this virus and keep our loved ones and neighbors safe.    For more information see CDC link below.      https://www.cdc.gov/coronavirus/2019-ncov/hcp/guidance-prevent-spread.html#precautions        Sources:  [facility], [place] [facility]           Sincerely,     Malathi Patton PA-C

## 2022-05-11 NOTE — PATIENT INSTRUCTIONS
You have tested positive for COVID-19 today.  Per the CDC, you are now in a 5 day isolation.    This isolation starts from the day you first developed symptoms, not the day of your positive test. For example, if your symptoms began on a Monday, and you waited until Friday of the same week to get tested, and it was positive, your 5 day isolation begins from that Monday, not the Friday you tested positive.    However, if you are asymptomatic (a person who does not have any symptoms), and received a COVID-19 test that was positive, your 5 day isolation begins on the day you tested positive.  This is regardless if you were exposed to a known positive days earlier.  So for example, if you test positive as an asymptomatic on day 7 from exposure, you have now extended your 5 day quarantine.    In order to return to activities of daily living per the CDC guidelines, you can be around other after: 5 days since symptoms first appeared, 24 hours with no fever without the use of fever-reducing medications, and other symptoms (besides loss of taste or smell) must be improving.  Once you meet all these requirements you may return to your normal activities including social distancing, wearing masks, and frequent handwashing - YOU DO NOT NEED ANOTHER TEST, OR TO TEST NEGATIVE, IN ORDER TO END YOUR QUARANTINE!    Anyone who has been exposed to you since 2 days before your symptoms started should follow CDC guidelines for quarantine.  The CDC recommends quarantine if you have been in close contact (within 6 feet of someone for a cumulative total of 15 minutes or more over a 24-hour period) with someone who has COVID-19, unless you have been fully vaccinated. People who are fully vaccinated do NOT need to quarantine after contact with someone who had COVID-19 unless they have symptoms. However, fully vaccinated people should get tested 3-5 days after their exposure, even they dont have symptoms and wear a mask indoors in public for  10 days following exposure or until their test result is negative.  Individuals are recommended to stay home for 5 days after your last contact with a person who has COVID-19.  Watch for symptoms of COVID-19.  If possible, stay away from people you live with, especially people who are at higher risk for getting very sick from COVID-19.  If you'd like to end your quarantine early, your options are as follows.  You may end your quarantine on day 7 IF you have a negative covid test at least 5 days from last known exposure with no covid symptoms by day 7.  Or you may end quarantine on day with no test and no covid symptoms.    Important home care recommendations include: monitor your symptoms, if you have trouble breathing please go to the ER. Stay in a separate room from other household members, if possible.  Use a separate bathroom, if possible.  Avoid contact with other members of the household and pets.  Don't share personal household items, like cups, towels, and utensils.  Wear a mask when around other people if able.  Please refer to CDC's websit for more information about what to do if you you're sick and how to notify your contacts.    Stay home except to get medical care. Most people with COVID-19 have mild illness and can recover at home without medical care. Do not leave your home, except to get medical care. Do not visit public areas. Get rest and stay hydrated. Call before you get medical care. Be sure to get care if you have trouble breathing, or have any other emergency warning signs, or if you think it is an emergency.  Avoid public transportation, ride-sharing, or taxis.  Separate yourself from other people.  As much as possible, stay in a specific room and away from other people and pets in your home. If possible, you should use a separate bathroom. If you need to be around other people or animals in or outside of the home, wear a mask.    Tell your close contacts that they may have been exposed to  COVID-19. An infected person can spread COVID-19 starting 48 hours (or 2 days) before the person has any symptoms or tests positive. By letting your close contacts know they may have been exposed to COVID-19, you are helping to protect everyone.    Additional guidance is available for those living in close quarters and shared housing on CDC's web site.  Please see COVID-19 and Animals if you have questions about pets.  If you are diagnosed with COVID-19, someone from the health department may call you. Answer the call to slow the spread.  Look for emergency warning signs* for COVID-19. If someone is showing any of these signs, seek emergency medical care immediately:  Trouble breathing  Persistent pain or pressure in the chest  New confusion  Inability to wake or stay awake  Pale, gray, or blue-colored skin, lips, or nail beds, depending on skin tone  *This list is not all possible symptoms. Please call your medical provider for any other symptoms that are severe or concerning to you.    Call ahead before visiting your doctor  Call ahead. Many medical visits for routine care are being postponed or done by phone or telemedicine.  If you have a medical appointment that cannot be postponed, call your doctors office, and tell them you have or may have COVID-19. This will help the office protect themselves and other patients.    You dont need to wear the mask if you are alone. If you cant put on a mask (because of trouble breathing, for example), cover your coughs and sneezes in some other way. Try to stay at least 6 feet away from other people. This will help protect the people around you.  Masks should not be placed on young children under age 2 years, anyone who has trouble breathing, or anyone who is not able to remove the mask without help.    Cover your coughs and sneezes  Cover your mouth and nose with a tissue when you cough or sneeze.  Throw away used tissues in a lined trash can.  Immediately wash your hands  with soap and water for at least 20 seconds. If soap and water are not available, clean your hands with an alcohol-based hand  that contains at least 60% alcohol.  hands wash light icon  Clean your hands often  Wash your hands often with soap and water for at least 20 seconds. This is especially important after blowing your nose, coughing, or sneezing; going to the bathroom; and before eating or preparing food.  Use hand  if soap and water are not available. Use an alcohol-based hand  with at least 60% alcohol, covering all surfaces of your hands and rubbing them together until they feel dry.  Soap and water are the best option, especially if hands are visibly dirty.  Avoid touching your eyes, nose, and mouth with unwashed hands.  Handwashing Tips  Avoid sharing personal household items  Do not share dishes, drinking glasses, cups, eating utensils, towels, or bedding with other people in your home.  Wash these items thoroughly after using them with soap and water or put in the .  spraybottle icon  Clean all high-touch surfaces everyday  Clean and disinfect high-touch surfaces in your sick room and bathroom; wear disposable gloves. Let someone else clean and disinfect surfaces in common areas, but you should clean your bedroom and bathroom, if possible.  If a caregiver or other person needs to clean and disinfect a sick persons bedroom or bathroom, they should do so on an as-needed basis. The caregiver/other person should wear a mask and disposable gloves prior to cleaning. They should wait as long as possible after the person who is sick has used the bathroom before coming in to clean and use the bathroom.  High-touch surfaces include phones, remote controls, counters, tabletops, doorknobs, bathroom fixtures, toilets, keyboards, tablets, and bedside tables.    Clean and disinfect areas that may have blood, stool, or body fluids on them.  Use household  and  disinfectants. Clean the area or item with soap and water or another detergent if it is dirty. Then, use a household disinfectant.  Be sure to follow the instructions on the label to ensure safe and effective use of the product. Many products recommend keeping the surface wet for several minutes to ensure germs are killed. Many also recommend precautions such as wearing gloves and making sure you have good ventilation during use of the product.  Use a product from EPAs List N: Disinfectants for Coronavirus (COVID-19)external icon.  Complete Disinfection Guidance      PLEASE READ YOUR DISCHARGE INSTRUCTIONS ENTIRELY AS IT CONTAINS IMPORTANT INFORMATION.    Please drink plenty of fluids.    Please get plenty of rest.    If not allergic, please take over the counter Tylenol (Acetaminophen) and/or Motrin (Ibuprofen) as directed for control of muscle aches, pain, and/or fever.    Please go to the Emergency Department for any shortness of breath or difficulty breathing.    For congestion, runny nose, or post nasal drip please take an over the counter antihistamine medication (Claritin/Zyrtec/Allegra) of your choice as directed or try an over the counter decongestant like Sudafed. You buy this behind the pharmacy counter.  You can also buy combination OTC antihistamine plus decongestant medications such at Zyrtec-D or Claritin-D.  Do not take decongestant if you suffer from high blood pressure.    For cough, you may be prescribed medication.  Take as prescribed.  If you were not prescribed any medication, you can use over the counter cough medications such as Robitussin.  If you have chest congestion you can consider using over the counter Mucinex but make sure you drink plenty of water to encourage mobilization of the secretions.    If you do have high blood pressure or palpitations, it is safe to take Coricidin HBP for relief of sinus symptoms.    Sore throat recommendations: Warm fluids (tea with honey, soup, broth),  warm salt water gargles, throat lozenges, rest, hydration.    If you  smoke, please stop smoking.    You must understand that you've received an Urgent Care treatment only and that you may be released before all of your medical problems are known or treated. You, the patient, will arrange for follow up as instructed. If your symptoms worsen or fail to improve you should go to the Emergency Room.     Your test was POSITIVE for COVID-19 (coronavirus).       Please isolate yourself at home.  You may leave home and/or return to work once the following conditions are met:    If you were not hospitalized and are not moderately to severely immunocompromised:   More than 5 days since symptoms first appeared AND  More than 24 hours fever free without medications AND  Symptoms are improving  Continue to wear a mask around others for 5 additional days.    If you were hospitalized OR are moderately to severely immunocompromised:  More than 20 days since symptoms first appeared  More than 24 hours fever free without medications  Symptoms have improved    If you had no symptoms but tested positive:  More than 5 days since the date of the first positive test (20 days if moderately to severely immunocompromised). If you develop symptoms, then use the guidelines above.  Continue to wear a mask around others for 5 additional days.      Contact Tracing    As one of the next steps, you will receive a call or text from the [place] [facility] ([facility]) COVID-19 Tracing Team. See the contact information below so you know not to ignore the health departments call. It is important that you contact them back immediately so they can help.      Contact Tracer Number:  [phone]  Caller ID for most carriers: [place] Dept [facility]     What is contact tracing?  Contact tracing is a process that helps identify everyone who has been in close contact with an infected person. Contact tracers let those people know they may have been exposed and  guide them on next steps. Confidentiality is important for everyone; no one will be told who may have exposed them to the virus.  Your involvement is important. The more we know about where and how this virus is spreading, the better chance we have at stopping it from spreading further.  What does exposure mean?  Exposure means you have been within 6 feet for more than 15 minutes with a person who has or had COVID-19.  What kind of questions do the contact tracers ask?  A contact tracer will confirm your basic contact information including name, address, phone number, and next of kin, as well as asking about any symptoms you may have had. Theyll also ask you how you think you may have gotten sick, such as places where you may have been exposed to the virus, and people you were with. Those names will never be shared with anyone outside of that call, and will only be used to help trace and stop the spread of the virus.   I have privacy concerns. How will the state use my information?  Your privacy about your health is important. All calls are completed using call centers that use the appropriate health privacy protection measures (HIPAA compliance), meaning that your patient information is safe. No one will ever ask you any questions related to immigration status. Your health comes first.   Do I have to participate?  You do not have to participate, but we strongly encourage you to. Contact tracing can help us catch and control new outbreaks as theyre developing to keep your friends and family safe.   What if I dont hear from anyone?  If you dont receive a call within 24 hours, you can call the number above right away to inquire about your status. That line is open from 8:00 am - 8:00 p.m., 7 days a week.  Contact tracing saves lives! Together, we have the power to beat this virus and keep our loved ones and neighbors safe.    For more information see CDC link below.       https://www.cdc.gov/coronavirus/2019-ncov/hcp/guidance-prevent-spread.html#precautions        Sources:  [facility], [place] [facility] and [facility]           Sincerely,     Malathi Patton PA-C

## 2022-05-31 ENCOUNTER — PATIENT MESSAGE (OUTPATIENT)
Dept: ADMINISTRATIVE | Facility: HOSPITAL | Age: 37
End: 2022-05-31
Payer: COMMERCIAL

## 2023-10-11 ENCOUNTER — TELEPHONE (OUTPATIENT)
Dept: FAMILY MEDICINE | Facility: CLINIC | Age: 38
End: 2023-10-11
Payer: COMMERCIAL

## 2023-10-11 DIAGNOSIS — Z12.31 ENCOUNTER FOR SCREENING MAMMOGRAM FOR BREAST CANCER: Primary | ICD-10-CM

## 2023-10-11 NOTE — TELEPHONE ENCOUNTER
----- Message from Marc Irby sent at 10/11/2023  4:53 PM CDT -----  Regarding: mammo orders  Contact: pt [phone]  MAMMO    Caller is requesting to schedule their annual mammogram appointment.  Order is not listed in EPIC.  Please enter order and contact patient to schedule.    Name of Caller: Deirdre  Where would they like the mammogram performed? [place]  Would the patient rather a call back or a response via [facility]? callback  Best Call Back Number:[phone]  Additional Information: last mmg 02/2022

## 2023-10-13 ENCOUNTER — TELEPHONE (OUTPATIENT)
Dept: FAMILY MEDICINE | Facility: CLINIC | Age: 38
End: 2023-10-13
Payer: COMMERCIAL

## 2023-10-30 ENCOUNTER — PATIENT MESSAGE (OUTPATIENT)
Dept: RADIOLOGY | Facility: HOSPITAL | Age: 38
End: 2023-10-30
Payer: COMMERCIAL

## 2023-11-07 ENCOUNTER — HOSPITAL ENCOUNTER (OUTPATIENT)
Dept: RADIOLOGY | Facility: HOSPITAL | Age: 38
Discharge: HOME | End: 2023-11-07
Attending: INTERNAL MEDICINE
Payer: COMMERCIAL

## 2023-11-07 VITALS — WEIGHT: 135 LBS | HEIGHT: 61 IN | BODY MASS INDEX: 25.49 KG/M2

## 2023-11-07 DIAGNOSIS — Z12.31 ENCOUNTER FOR SCREENING MAMMOGRAM FOR BREAST CANCER: ICD-10-CM

## 2023-11-07 PROCEDURE — 77067 SCR MAMMO BI INCL CAD: CPT | Mod: TC

## 2023-11-07 PROCEDURE — 77063 BREAST TOMOSYNTHESIS BI: CPT | Mod: 26,,, | Performed by: RADIOLOGY

## 2023-11-07 PROCEDURE — 77063 MAMMO DIGITAL SCREENING BILAT WITH TOMO: ICD-10-PCS | Mod: 26,,, | Performed by: RADIOLOGY

## 2023-11-07 PROCEDURE — 77067 SCR MAMMO BI INCL CAD: CPT | Mod: 26,,, | Performed by: RADIOLOGY

## 2023-11-07 PROCEDURE — 77067 MAMMO DIGITAL SCREENING BILAT WITH TOMO: ICD-10-PCS | Mod: 26,,, | Performed by: RADIOLOGY

## 2023-11-08 ENCOUNTER — OFFICE VISIT (OUTPATIENT)
Dept: FAMILY MEDICINE | Facility: CLINIC | Age: 38
End: 2023-11-08
Payer: COMMERCIAL

## 2023-11-08 VITALS
SYSTOLIC BLOOD PRESSURE: 122 MMHG | WEIGHT: 143.06 LBS | BODY MASS INDEX: 27.01 KG/M2 | DIASTOLIC BLOOD PRESSURE: 78 MMHG | OXYGEN SATURATION: 100 % | HEIGHT: 61 IN | HEART RATE: 96 BPM

## 2023-11-08 DIAGNOSIS — Z00.00 ANNUAL PHYSICAL EXAM: Primary | ICD-10-CM

## 2023-11-08 DIAGNOSIS — M25.50 ARTHRALGIA OF MULTIPLE JOINTS: ICD-10-CM

## 2023-11-08 DIAGNOSIS — R79.89 LOW VITAMIN D LEVEL: ICD-10-CM

## 2023-11-08 DIAGNOSIS — L73.9 FOLLICULITIS: ICD-10-CM

## 2023-11-08 DIAGNOSIS — L81.9 DISCOLORED SKIN: ICD-10-CM

## 2023-11-08 PROCEDURE — 3074F PR MOST RECENT SYSTOLIC BLOOD PRESSURE < 130 MM HG: ICD-10-PCS | Mod: CPTII,S$GLB,, | Performed by: INTERNAL MEDICINE

## 2023-11-08 PROCEDURE — 99395 PREV VISIT EST AGE 18-39: CPT | Mod: 25,S$GLB,, | Performed by: INTERNAL MEDICINE

## 2023-11-08 PROCEDURE — 99999 PR PBB SHADOW E&M-EST. PATIENT-LVL IV: CPT | Mod: PBBFAC,,, | Performed by: INTERNAL MEDICINE

## 2023-11-08 PROCEDURE — 1160F PR REVIEW ALL MEDS BY PRESCRIBER/CLIN PHARMACIST DOCUMENTED: ICD-10-PCS | Mod: CPTII,S$GLB,, | Performed by: INTERNAL MEDICINE

## 2023-11-08 PROCEDURE — 1160F RVW MEDS BY RX/DR IN RCRD: CPT | Mod: CPTII,S$GLB,, | Performed by: INTERNAL MEDICINE

## 2023-11-08 PROCEDURE — 90471 IMMUNIZATION ADMIN: CPT | Mod: S$GLB,,, | Performed by: INTERNAL MEDICINE

## 2023-11-08 PROCEDURE — 90715 TDAP VACCINE GREATER THAN OR EQUAL TO 7YO IM: ICD-10-PCS | Mod: S$GLB,,, | Performed by: INTERNAL MEDICINE

## 2023-11-08 PROCEDURE — 3008F PR BODY MASS INDEX (BMI) DOCUMENTED: ICD-10-PCS | Mod: CPTII,S$GLB,, | Performed by: INTERNAL MEDICINE

## 2023-11-08 PROCEDURE — 90471 TDAP VACCINE GREATER THAN OR EQUAL TO 7YO IM: ICD-10-PCS | Mod: S$GLB,,, | Performed by: INTERNAL MEDICINE

## 2023-11-08 PROCEDURE — 99999 PR PBB SHADOW E&M-EST. PATIENT-LVL IV: ICD-10-PCS | Mod: PBBFAC,,, | Performed by: INTERNAL MEDICINE

## 2023-11-08 PROCEDURE — 3078F PR MOST RECENT DIASTOLIC BLOOD PRESSURE < 80 MM HG: ICD-10-PCS | Mod: CPTII,S$GLB,, | Performed by: INTERNAL MEDICINE

## 2023-11-08 PROCEDURE — 3078F DIAST BP <80 MM HG: CPT | Mod: CPTII,S$GLB,, | Performed by: INTERNAL MEDICINE

## 2023-11-08 PROCEDURE — 3074F SYST BP LT 130 MM HG: CPT | Mod: CPTII,S$GLB,, | Performed by: INTERNAL MEDICINE

## 2023-11-08 PROCEDURE — 99395 PR PREVENTIVE VISIT,EST,18-39: ICD-10-PCS | Mod: 25,S$GLB,, | Performed by: INTERNAL MEDICINE

## 2023-11-08 PROCEDURE — 3008F BODY MASS INDEX DOCD: CPT | Mod: CPTII,S$GLB,, | Performed by: INTERNAL MEDICINE

## 2023-11-08 PROCEDURE — 1159F MED LIST DOCD IN RCRD: CPT | Mod: CPTII,S$GLB,, | Performed by: INTERNAL MEDICINE

## 2023-11-08 PROCEDURE — 90715 TDAP VACCINE 7 YRS/> IM: CPT | Mod: S$GLB,,, | Performed by: INTERNAL MEDICINE

## 2023-11-08 PROCEDURE — 1159F PR MEDICATION LIST DOCUMENTED IN MEDICAL RECORD: ICD-10-PCS | Mod: CPTII,S$GLB,, | Performed by: INTERNAL MEDICINE

## 2023-11-08 RX ORDER — HYDROQUINONE 40 MG/G
CREAM TOPICAL 2 TIMES DAILY
Qty: 45 G | Refills: 2 | Status: SHIPPED | OUTPATIENT
Start: 2023-11-08 | End: 2023-12-08

## 2023-11-08 RX ORDER — MAGNESIUM GLYCINATE 100 MG
1 CAPSULE ORAL DAILY PRN
Qty: 30 CAPSULE | Refills: 11 | Status: SHIPPED | OUTPATIENT
Start: 2023-11-08

## 2023-11-08 RX ORDER — CHOLECALCIFEROL (VITAMIN D3) 50 MCG
1 TABLET ORAL DAILY
Qty: 90 TABLET | Refills: 3 | Status: SHIPPED | OUTPATIENT
Start: 2023-11-08

## 2023-11-08 NOTE — PROGRESS NOTES
Subjective:       Patient ID: Deirdre Prado is a 38 y.o. female.    Chief Complaint: Annual Exam and Abrasion      HPI  Deirdre Prado is a 38 y.o. female with migraines who presents today for Annual Exam and Abrasion    Reports she continues with multiple joint pain.  After last visit she was found with a positive EVGENY and was seen by Rheumatology.  She had workup for autoimmune disease workup found within normal limits and no evidence of lupus or synovitis.  She was seen by Cardiology for palpitations and increased heart rate with normal workup.  It was felt her symptoms were associated to costochondritis with increase high rate as reaction to pain.  Also had sweats with no evidence of infection, a negative QuantiFERON gold, and normal chest x-ray.  Still has episodes of increased heart rate that are self-limited.    Reports sweats at night and slowly gaining weight.  No nausea or GI symptoms.  She has been having boils in pubic and external genitalia that she drains and resolved but appear to returned at a different site.  Now with 1 on her upper thigh close to the groin.  Has had no fever.   has noted some dark skin discolorations that started in her face close to hairline area and now torso and back.  There is no itching or pain.    Has history of migraines that are controlled with present treatment.    Has no toxic habits.  Works at a school.    Last Pap smear was on 2021.    Up-to-date with flu vaccine.    Health Maintenance:  Health Maintenance   Topic Date Due    TETANUS VACCINE  11/08/2033    Hepatitis C Screening  Completed    Lipid Panel  Completed       Review of Systems   Constitutional:  Positive for diaphoresis (night sweats) and unexpected weight change (slow weight gain). Negative for fever.   HENT: Negative.     Respiratory: Negative.     Cardiovascular: Negative.    Gastrointestinal: Negative.    Genitourinary:  Negative for difficulty urinating.        Genital boils    Musculoskeletal: Negative.    Integumentary:  Positive for color change (dark discoloration in torso).   Neurological: Negative.    Psychiatric/Behavioral: Negative.        Past Medical History:   Diagnosis Date    Breast disorder     Depression     Mental disorder     Migraine        Past Surgical History:   Procedure Laterality Date    TUBAL LIGATION         Family History   Problem Relation Age of Onset    Cancer Mother     Breast cancer Mother     No Known Problems Father     Breast cancer Maternal Grandmother     Lupus Neg Hx     Rheum arthritis Neg Hx     Inflammatory bowel disease Neg Hx        Social History     Socioeconomic History    Marital status:    Tobacco Use    Smoking status: Never    Smokeless tobacco: Never   Substance and Sexual Activity    Alcohol use: Yes    Drug use: No    Sexual activity: Yes     Partners: Male       Current Outpatient Medications   Medication Sig Dispense Refill    amitriptyline (ELAVIL) 25 MG tablet TK 1 T PO HS      cholecalciferol, vitamin D3, (VITAMIN D3) 50 mcg (2,000 unit) Tab Take 1 tablet (2,000 Units total) by mouth once daily. 90 tablet 3    hydroquinone 4 % Crea Apply topically 2 (two) times daily. 45 g 2    magnesium glycinate 100 mg magnesium Cap Take 1 tablet by mouth daily as needed (cramps). 30 capsule 11    naproxen (EC NAPROSYN) 500 MG EC tablet Take 1 tablet (500 mg total) by mouth 2 (two) times daily. (Patient not taking: Reported on 2/11/2021) 60 tablet 2    rizatriptan (MAXALT-MLT) 10 MG disintegrating tablet DIS 1 T ON THE TONGUE D PRF MIGRAINE HA       No current facility-administered medications for this visit.       Review of patient's allergies indicates:  No Known Allergies      Objective:       Last 3 sets of Vitals        5/11/2022     9:11 AM 11/7/2023     3:29 PM 11/8/2023     3:41 PM   Vitals - 1 value per visit   SYSTOLIC 137  122   DIASTOLIC 86  78   Pulse 93  96   Temp 99 °F (37.2 °C)     Resp 20     SPO2 98 %  100 %   Weight  "(lb) 135 135 143.08   Weight (kg) 61.236 61.236 64.9   Height 5' 1" (1.549 m) 5' 1" (1.549 m) 5' 1" (1.549 m)   BMI (Calculated) 25.5 25.5 27   Pain Score Eight  Zero   Physical Exam  Constitutional:       General: She is not in acute distress.  HENT:      Head: Normocephalic.      Right Ear: Tympanic membrane, ear canal and external ear normal.      Left Ear: Tympanic membrane, ear canal and external ear normal.      Nose: Nose normal.      Mouth/Throat:      Mouth: Mucous membranes are moist.   Eyes:      General: No scleral icterus.     Extraocular Movements: Extraocular movements intact.      Conjunctiva/sclera: Conjunctivae normal.   Neck:      Vascular: No carotid bruit.      Comments: No goiter.  Cardiovascular:      Rate and Rhythm: Normal rate and regular rhythm.      Pulses: Normal pulses.      Heart sounds: Normal heart sounds.   Pulmonary:      Effort: Pulmonary effort is normal.      Breath sounds: Normal breath sounds.   Abdominal:      General: Bowel sounds are normal. There is no distension.      Palpations: Abdomen is soft. There is no mass.      Tenderness: There is no abdominal tenderness.   Musculoskeletal:         General: No swelling.   Lymphadenopathy:      Cervical: No cervical adenopathy.   Skin:     General: Skin is warm and dry.      Findings: Lesion (Indurated area in anterior upper thigh, close to groin, scabbed and not draining.  Slightly warm.) present.      Comments: Has slight GERD discoloration on left side of her face by hairline, slightly time and non raised discoloration which is faint and irregular.   Neurological:      General: No focal deficit present.      Mental Status: She is alert and oriented to person, place, and time.   Psychiatric:         Mood and Affect: Mood normal.         Behavior: Behavior normal.           CBC:  Recent Labs   Lab 12/16/20  0912   WBC 4.83   RBC 4.43   Hemoglobin 12.7   Hematocrit 40.2   Platelets 177   MCV 91   MCH 28.7   MCHC 31.6 L "     CMP:  Recent Labs   Lab 12/16/20  0912   Glucose 94   Calcium 9.0   Albumin 4.4   Total Protein 7.4   Sodium 140   Potassium 4.4   CO2 26   Chloride 106   BUN 7   Creatinine 0.7   Alkaline Phosphatase 33 L   ALT 12   AST 17   Total Bilirubin 0.4     URINALYSIS:  Recent Labs   Lab 12/16/20  0857   Color, UA Straw   Specific [place], UA 1.005   pH, UA 7.0   Protein, UA Negative   Nitrite, UA Negative   Leukocytes, UA 1+ A      LIPIDS:      TSH:        A1C:        Imaging:  Mammo Digital Diagnostic Bilat with Oz, US Breast Bilateral Limited  Narrative: Result:  Mammo Digital Diagnostic Bilat with Oz  US Breast Bilateral Limited    History:  Right nipple inversion for many years.     Films Compared:  Prior images (if available) were compared.    Findings:  Diagnostic Mammogram:  Computer-aided detection was utilized in the interpretation of this   examination. This procedure was performed using tomosynthesis.       The breasts are extremely dense, which lowers the sensitivity of   mammography. There is no evidence of suspicious masses,   microcalcifications or architectural distortion.     Breast Ultrasound:  Limited Breast ultrasound was performed. Ultrasound evaluation   demonstrates no suspicious abnormality.     No sonographic correlate for the right nipple inversion (normal tissue).    By clinical exam, there is central right nipple inversion without   associated suspicious abnormality.  This is a normal variant.    No suspicious finding in either breast.  Impression: There is no mammographic or sonographic evidence of malignancy.    BI-RADS Category 1: Negative    Recommendation:  Routine Screening mammogram in 1 Year, High Risk      The findings and recommendations were discussed with the patient by Dr. CRAIG Demarco in person at the time of interpretation.    Your estimated lifetime risk of breast cancer (to age 85) based on   Tyrer-Cuzick risk assessment model is Tyrer-Cuzick: 24.89 %. According to   the  American Cancer Society, patients with a lifetime breast cancer risk   of 20% or higher might benefit from supplemental screening tests.      Assessment:       1. Annual physical exam    2. Discolored skin    3. Arthralgia of multiple joints    4. Folliculitis    5. Low vitamin D level            Plan:       1. Annual physical exam  -     CBC Auto Differential; Future; Expected date: 11/08/2023  -     Comprehensive Metabolic Panel; Future; Expected date: 11/08/2023  -     Hemoglobin A1C; Future; Expected date: 11/08/2023  -     Lipid Panel; Future; Expected date: 11/08/2023  -     C-Reactive Protein; Future; Expected date: 11/08/2023  -     TSH; Future; Expected date: 11/08/2023  - stable physical exam with normal vital signs and BMI less than 30.  - has gynecology appointment    2. Discolored skin  -     appears to be associated to melasma, a faint tinea not ruled out but will consult dermatology.  - hydroquinone 4 % Crea; Apply topically 2 (two) times daily.  Dispense: 45 g; Refill: 2  -     Ambulatory referral/consult to Dermatology; Future; Expected date: 11/08/2023    3. Arthralgia of multiple joints  -     C-Reactive Protein; Future; Expected date: 11/08/2023  - no synovitis noted, rheumatology workup was negative but fibromyalgia could be considered.  - we would try magnesium, could also consider turmeric or other anti-inflammatory supplements    4. Folliculitis   - antibiotic treatment with doxycycline will be prescribed.  Also will add antibiotic cream.  Can apply 1 compresses.    - Can also be evaluated by dermatology.    5. Low vitamin D level  -     cholecalciferol, vitamin D3, (VITAMIN D3) 50 mcg (2,000 unit) Tab; Take 1 tablet (2,000 Units total) by mouth once daily.  Dispense: 90 tablet; Refill: 3    Other orders  -     magnesium glycinate 100 mg magnesium Cap; Take 1 tablet by mouth daily as needed (cramps).  Dispense: 30 capsule; Refill: 11- hoping to help muscle cramps, and symptoms and maybe  associated to anxiety or stress.  -     Tdap Vaccine       Health Maintenance Due   Topic Date Due    Hemoglobin A1c (Diabetic Prevention Screening)  Never done    COVID-19 Vaccine (4 - 2023-24 season) 09/01/2023              Zenaida Calix MD  [facility]  Disclaimer:  This note has been generated using voice-recognition software. There may be grammatical or spelling errors that have been missed during proof-reading

## 2023-11-09 RX ORDER — MUPIROCIN 20 MG/G
OINTMENT TOPICAL 2 TIMES DAILY
Qty: 30 G | Refills: 1 | Status: SHIPPED | OUTPATIENT
Start: 2023-11-09 | End: 2024-03-28 | Stop reason: SDUPTHER

## 2023-11-09 RX ORDER — DOXYCYCLINE HYCLATE 100 MG
100 TABLET ORAL 2 TIMES DAILY
Qty: 14 TABLET | Refills: 0 | Status: SHIPPED | OUTPATIENT
Start: 2023-11-09 | End: 2023-11-16

## 2023-11-11 ENCOUNTER — LAB VISIT (OUTPATIENT)
Dept: LAB | Facility: HOSPITAL | Age: 38
End: 2023-11-11
Attending: INTERNAL MEDICINE
Payer: COMMERCIAL

## 2023-11-11 DIAGNOSIS — M25.50 ARTHRALGIA OF MULTIPLE JOINTS: ICD-10-CM

## 2023-11-11 DIAGNOSIS — Z00.00 ANNUAL PHYSICAL EXAM: ICD-10-CM

## 2023-11-11 LAB
ALBUMIN SERPL BCP-MCNC: 3.7 G/DL (ref 3.5–5.2)
ALP SERPL-CCNC: 46 U/L (ref 55–135)
ALT SERPL W/O P-5'-P-CCNC: 12 U/L (ref 10–44)
ANION GAP SERPL CALC-SCNC: 9 MMOL/L (ref 8–16)
ANISOCYTOSIS BLD QL SMEAR: SLIGHT
AST SERPL-CCNC: 15 U/L (ref 10–40)
BASOPHILS NFR BLD: 0 % (ref 0–1.9)
BILIRUB SERPL-MCNC: <0.1 MG/DL (ref 0.1–1)
BUN SERPL-MCNC: 14 MG/DL (ref 6–20)
BURR CELLS BLD QL SMEAR: ABNORMAL
CALCIUM SERPL-MCNC: 8.6 MG/DL (ref 8.7–10.5)
CHLORIDE SERPL-SCNC: 109 MMOL/L (ref 95–110)
CHOLEST SERPL-MCNC: 185 MG/DL (ref 120–199)
CHOLEST/HDLC SERPL: 4.5 {RATIO} (ref 2–5)
CO2 SERPL-SCNC: 22 MMOL/L (ref 23–29)
CREAT SERPL-MCNC: 0.7 MG/DL (ref 0.5–1.4)
CRP SERPL-MCNC: 8.8 MG/L (ref 0–8.2)
DIFFERENTIAL METHOD: ABNORMAL
EOSINOPHIL NFR BLD: 2 % (ref 0–8)
ERYTHROCYTE [DISTWIDTH] IN BLOOD BY AUTOMATED COUNT: 17.6 % (ref 11.5–14.5)
EST. GFR  (NO RACE VARIABLE): >60 ML/MIN/1.73 M^2
ESTIMATED AVG GLUCOSE: 111 MG/DL (ref 68–131)
GLUCOSE SERPL-MCNC: 93 MG/DL (ref 70–110)
HBA1C MFR BLD: 5.5 % (ref 4–5.6)
HCT VFR BLD AUTO: 35.4 % (ref 37–48.5)
HDLC SERPL-MCNC: 41 MG/DL (ref 40–75)
HDLC SERPL: 22.2 % (ref 20–50)
HGB BLD-MCNC: 10.4 G/DL (ref 12–16)
HYPOCHROMIA BLD QL SMEAR: ABNORMAL
IMM GRANULOCYTES # BLD AUTO: ABNORMAL K/UL (ref 0–0.04)
IMM GRANULOCYTES NFR BLD AUTO: ABNORMAL % (ref 0–0.5)
LDLC SERPL CALC-MCNC: 126.2 MG/DL (ref 63–159)
LYMPHOCYTES NFR BLD: 33 % (ref 18–48)
MCH RBC QN AUTO: 22.2 PG (ref 27–31)
MCHC RBC AUTO-ENTMCNC: 29.4 G/DL (ref 32–36)
MCV RBC AUTO: 76 FL (ref 82–98)
MONOCYTES NFR BLD: 1 % (ref 4–15)
NEUTROPHILS NFR BLD: 62 % (ref 38–73)
NEUTS BAND NFR BLD MANUAL: 2 %
NONHDLC SERPL-MCNC: 144 MG/DL
NRBC BLD-RTO: 0 /100 WBC
PLATELET # BLD AUTO: 289 K/UL (ref 150–450)
PLATELET BLD QL SMEAR: ABNORMAL
PMV BLD AUTO: 12.4 FL (ref 9.2–12.9)
POIKILOCYTOSIS BLD QL SMEAR: SLIGHT
POTASSIUM SERPL-SCNC: 4.3 MMOL/L (ref 3.5–5.1)
PROT SERPL-MCNC: 7.1 G/DL (ref 6–8.4)
RBC # BLD AUTO: 4.68 M/UL (ref 4–5.4)
SODIUM SERPL-SCNC: 140 MMOL/L (ref 136–145)
TRIGL SERPL-MCNC: 89 MG/DL (ref 30–150)
TSH SERPL DL<=0.005 MIU/L-ACNC: 0.97 UIU/ML (ref 0.4–4)
WBC # BLD AUTO: 6.55 K/UL (ref 3.9–12.7)

## 2023-11-11 PROCEDURE — 86140 C-REACTIVE PROTEIN: CPT | Performed by: INTERNAL MEDICINE

## 2023-11-11 PROCEDURE — 85027 COMPLETE CBC AUTOMATED: CPT | Performed by: INTERNAL MEDICINE

## 2023-11-11 PROCEDURE — 80061 LIPID PANEL: CPT | Performed by: INTERNAL MEDICINE

## 2023-11-11 PROCEDURE — 80053 COMPREHEN METABOLIC PANEL: CPT | Performed by: INTERNAL MEDICINE

## 2023-11-11 PROCEDURE — 85007 BL SMEAR W/DIFF WBC COUNT: CPT | Performed by: INTERNAL MEDICINE

## 2023-11-11 PROCEDURE — 36415 COLL VENOUS BLD VENIPUNCTURE: CPT | Performed by: INTERNAL MEDICINE

## 2023-11-11 PROCEDURE — 83036 HEMOGLOBIN GLYCOSYLATED A1C: CPT | Performed by: INTERNAL MEDICINE

## 2023-11-11 PROCEDURE — 84443 ASSAY THYROID STIM HORMONE: CPT | Performed by: INTERNAL MEDICINE

## 2023-11-15 ENCOUNTER — PATIENT MESSAGE (OUTPATIENT)
Dept: FAMILY MEDICINE | Facility: CLINIC | Age: 38
End: 2023-11-15
Payer: COMMERCIAL

## 2023-11-15 DIAGNOSIS — D64.9 ANEMIA, UNSPECIFIED TYPE: Primary | ICD-10-CM

## 2023-11-15 RX ORDER — PANTOPRAZOLE SODIUM 40 MG/1
40 TABLET, DELAYED RELEASE ORAL DAILY
Qty: 30 TABLET | Refills: 11 | Status: SHIPPED | OUTPATIENT
Start: 2023-11-15 | End: 2024-11-14

## 2023-11-16 RX ORDER — FERROUS SULFATE 325(65) MG
325 TABLET ORAL DAILY
Qty: 90 TABLET | Refills: 0 | Status: SHIPPED | OUTPATIENT
Start: 2023-11-16

## 2023-12-20 ENCOUNTER — OFFICE VISIT (OUTPATIENT)
Dept: OBSTETRICS AND GYNECOLOGY | Facility: CLINIC | Age: 38
End: 2023-12-20
Payer: COMMERCIAL

## 2023-12-20 VITALS
SYSTOLIC BLOOD PRESSURE: 123 MMHG | HEART RATE: 88 BPM | BODY MASS INDEX: 27.53 KG/M2 | WEIGHT: 145.75 LBS | DIASTOLIC BLOOD PRESSURE: 86 MMHG

## 2023-12-20 DIAGNOSIS — Z01.419 WELL WOMAN EXAM: Primary | ICD-10-CM

## 2023-12-20 PROCEDURE — 3044F PR MOST RECENT HEMOGLOBIN A1C LEVEL <7.0%: ICD-10-PCS | Mod: CPTII,S$GLB,, | Performed by: STUDENT IN AN ORGANIZED HEALTH CARE EDUCATION/TRAINING PROGRAM

## 2023-12-20 PROCEDURE — 1160F RVW MEDS BY RX/DR IN RCRD: CPT | Mod: CPTII,S$GLB,, | Performed by: STUDENT IN AN ORGANIZED HEALTH CARE EDUCATION/TRAINING PROGRAM

## 2023-12-20 PROCEDURE — 1159F PR MEDICATION LIST DOCUMENTED IN MEDICAL RECORD: ICD-10-PCS | Mod: CPTII,S$GLB,, | Performed by: STUDENT IN AN ORGANIZED HEALTH CARE EDUCATION/TRAINING PROGRAM

## 2023-12-20 PROCEDURE — 3079F PR MOST RECENT DIASTOLIC BLOOD PRESSURE 80-89 MM HG: ICD-10-PCS | Mod: CPTII,S$GLB,, | Performed by: STUDENT IN AN ORGANIZED HEALTH CARE EDUCATION/TRAINING PROGRAM

## 2023-12-20 PROCEDURE — 1159F MED LIST DOCD IN RCRD: CPT | Mod: CPTII,S$GLB,, | Performed by: STUDENT IN AN ORGANIZED HEALTH CARE EDUCATION/TRAINING PROGRAM

## 2023-12-20 PROCEDURE — 3008F PR BODY MASS INDEX (BMI) DOCUMENTED: ICD-10-PCS | Mod: CPTII,S$GLB,, | Performed by: STUDENT IN AN ORGANIZED HEALTH CARE EDUCATION/TRAINING PROGRAM

## 2023-12-20 PROCEDURE — 3074F PR MOST RECENT SYSTOLIC BLOOD PRESSURE < 130 MM HG: ICD-10-PCS | Mod: CPTII,S$GLB,, | Performed by: STUDENT IN AN ORGANIZED HEALTH CARE EDUCATION/TRAINING PROGRAM

## 2023-12-20 PROCEDURE — 3079F DIAST BP 80-89 MM HG: CPT | Mod: CPTII,S$GLB,, | Performed by: STUDENT IN AN ORGANIZED HEALTH CARE EDUCATION/TRAINING PROGRAM

## 2023-12-20 PROCEDURE — 99395 PREV VISIT EST AGE 18-39: CPT | Mod: S$GLB,,, | Performed by: STUDENT IN AN ORGANIZED HEALTH CARE EDUCATION/TRAINING PROGRAM

## 2023-12-20 PROCEDURE — 3008F BODY MASS INDEX DOCD: CPT | Mod: CPTII,S$GLB,, | Performed by: STUDENT IN AN ORGANIZED HEALTH CARE EDUCATION/TRAINING PROGRAM

## 2023-12-20 PROCEDURE — 99395 PR PREVENTIVE VISIT,EST,18-39: ICD-10-PCS | Mod: S$GLB,,, | Performed by: STUDENT IN AN ORGANIZED HEALTH CARE EDUCATION/TRAINING PROGRAM

## 2023-12-20 PROCEDURE — 3074F SYST BP LT 130 MM HG: CPT | Mod: CPTII,S$GLB,, | Performed by: STUDENT IN AN ORGANIZED HEALTH CARE EDUCATION/TRAINING PROGRAM

## 2023-12-20 PROCEDURE — 99999 PR PBB SHADOW E&M-EST. PATIENT-LVL III: ICD-10-PCS | Mod: PBBFAC,,, | Performed by: STUDENT IN AN ORGANIZED HEALTH CARE EDUCATION/TRAINING PROGRAM

## 2023-12-20 PROCEDURE — 99999 PR PBB SHADOW E&M-EST. PATIENT-LVL III: CPT | Mod: PBBFAC,,, | Performed by: STUDENT IN AN ORGANIZED HEALTH CARE EDUCATION/TRAINING PROGRAM

## 2023-12-20 PROCEDURE — 3044F HG A1C LEVEL LT 7.0%: CPT | Mod: CPTII,S$GLB,, | Performed by: STUDENT IN AN ORGANIZED HEALTH CARE EDUCATION/TRAINING PROGRAM

## 2023-12-20 PROCEDURE — 1160F PR REVIEW ALL MEDS BY PRESCRIBER/CLIN PHARMACIST DOCUMENTED: ICD-10-PCS | Mod: CPTII,S$GLB,, | Performed by: STUDENT IN AN ORGANIZED HEALTH CARE EDUCATION/TRAINING PROGRAM

## 2023-12-20 NOTE — PROGRESS NOTES
History & Physical  Gynecology      SUBJECTIVE:     Chief Complaint: Annual Exam       History of Present Illness:  38 y.o. female  here for annual GYN visit.   She describes her periods as regular, with normal flow. She denies intermenstrual bleeding.   She denies vaginal itching, irritation, or discharge.  Patient is sexually active with 1 male partner.  She uses bilateral tubal ligation for contraception.  She does not use tobacco, socially consumes alcohol and denies drug use.   She is , lives with her family and reports feeling safe at home.     Patient has no history of abnormal paps  Last Pap:  - NILM  Last MM - BIRADS - 1    The patient mother  of breast cancer at the age of 49.       Review of patient's allergies indicates:  No Known Allergies    Past Medical History:   Diagnosis Date    Breast disorder     Depression     Mental disorder     Migraine      Past Surgical History:   Procedure Laterality Date    TUBAL LIGATION       OB History          2    Para   2    Term   2            AB        Living   2         SAB        IAB        Ectopic        Multiple        Live Births   2               Family History   Problem Relation Age of Onset    Cancer Mother     Breast cancer Mother     No Known Problems Father     Breast cancer Maternal Grandmother     Lupus Neg Hx     Rheum arthritis Neg Hx     Inflammatory bowel disease Neg Hx      Social History     Tobacco Use    Smoking status: Never    Smokeless tobacco: Never   Substance Use Topics    Alcohol use: Yes    Drug use: No       Current Outpatient Medications   Medication Sig    amitriptyline (ELAVIL) 25 MG tablet TK 1 T PO HS    cholecalciferol, vitamin D3, (VITAMIN D3) 50 mcg (2,000 unit) Tab Take 1 tablet (2,000 Units total) by mouth once daily.    ferrous sulfate (FEOSOL) 325 mg (65 mg iron) Tab tablet Take 1 tablet (325 mg total) by mouth once daily.    magnesium glycinate 100 mg magnesium Cap  Take 1 tablet by mouth daily as needed (cramps).    mupirocin (BACTROBAN) 2 % ointment Apply topically 2 (two) times daily.    pantoprazole (PROTONIX) 40 MG tablet Take 1 tablet (40 mg total) by mouth once daily.    naproxen (EC NAPROSYN) 500 MG EC tablet Take 1 tablet (500 mg total) by mouth 2 (two) times daily.    rizatriptan (MAXALT-MLT) 10 MG disintegrating tablet DIS 1 T ON THE TONGUE D PRF MIGRAINE HA     No current facility-administered medications for this visit.       Review of Systems:  Review of Systems   Constitutional:  Negative for chills, fatigue and fever.   HENT:  Negative for congestion.    Eyes:  Negative for visual disturbance.   Respiratory:  Negative for cough and shortness of breath.    Cardiovascular:  Negative for chest pain and palpitations.   Gastrointestinal:  Negative for abdominal distention, abdominal pain, constipation, diarrhea, nausea and vomiting.   Genitourinary:  Negative for difficulty urinating, dysuria, hematuria, vaginal bleeding and vaginal discharge.   Skin:  Negative for rash.   Neurological:  Negative for dizziness, seizures, light-headedness and headaches.   Hematological:  Does not bruise/bleed easily.   Psychiatric/Behavioral:  Negative for dysphoric mood. The patient is not nervous/anxious.         OBJECTIVE:     Physical Exam:  Vitals:    12/20/23 0820   BP: 123/86   Pulse: 88      Physical Exam  Vitals and nursing note reviewed. Exam conducted with a chaperone present.   Constitutional:       General: She is not in acute distress.     Appearance: She is well-developed.   HENT:      Head: Normocephalic and atraumatic.   Eyes:      Pupils: Pupils are equal, round, and reactive to light.   Cardiovascular:      Rate and Rhythm: Normal rate and regular rhythm.   Pulmonary:      Effort: Pulmonary effort is normal. No respiratory distress.   Chest:   Breasts:     Right: Normal.      Left: Normal.       Abdominal:      General: There is no distension.      Palpations:  Abdomen is soft. There is no mass.      Tenderness: There is no abdominal tenderness. There is no guarding.   Musculoskeletal:         General: Normal range of motion.      Cervical back: Normal range of motion and neck supple.   Skin:     General: Skin is warm and dry.   Neurological:      Mental Status: She is alert and oriented to person, place, and time.   Psychiatric:         Behavior: Behavior normal.         Thought Content: Thought content normal.         Judgment: Judgment normal.         ASSESSMENT/PLAN:     1. Well woman exam  - Pap smear - Due 2024  - Mammogram - Up to date  - Colonoscopy - At age 44  - Calcium and Vitamin D counseling  14 - 30 years old 1,300mg Ca/d; 600 IU vit D/d  31 - 50 years old 1,000 Ca/d; 600 IU vit D/d  51 - 70 year old: 1,200 Ca/d; 600 IU vit D/d  71+ years old 1,200 Ca/d; 800 IU vit D/d    Eleno Kaufman M.D.  OB/GYN  [facility]

## 2024-02-27 ENCOUNTER — TELEPHONE (OUTPATIENT)
Dept: OBSTETRICS AND GYNECOLOGY | Facility: CLINIC | Age: 39
End: 2024-02-27
Payer: COMMERCIAL

## 2024-02-27 NOTE — TELEPHONE ENCOUNTER
----- Message from Lynn Torres sent at 2/27/2024  4:15 PM CST -----  Type:  Needs Medical Advice    Who Called: pt  Symptoms (please be specific): pt states that she never received her results from her pap smear back on 12/20  Would the patient rather a call back or a response via [facility]? [facility]  Best Call Back Number:  [phone]  Additional Information:

## 2024-02-27 NOTE — TELEPHONE ENCOUNTER
Returned pt call and she was hoping to receive pap smear result from December visit of 2023. I advised her that we didn't collect a pap smear specimen. Pt states she believed it was collected and this was the whole reason for the visit. I advised her that according to hx paps aren't always collected each year or at each annual visit. And according to his office note pap was not collected or sent out. Please advise

## 2024-02-29 NOTE — TELEPHONE ENCOUNTER
Advised and pt requested to be seen for pain with sexual relations with her partner. Scheduled for 3/13/24. Pt verbalized understanding.

## 2024-03-27 ENCOUNTER — PATIENT OUTREACH (OUTPATIENT)
Dept: ADMINISTRATIVE | Facility: HOSPITAL | Age: 39
End: 2024-03-27
Payer: COMMERCIAL

## 2024-03-27 NOTE — PROGRESS NOTES
Population Health Chart Review & Patient Outreach Details      Additional [facility] Health Notes:               Updates Requested / Reviewed:      Updated Care Coordination Note, Care Everywhere, and Immunizations Reconciliation Completed or Queried: [place]         Health Maintenance Topics Overdue:      VBHM Score: 0     Patient is not due for any topics at this time.                       Health Maintenance Topic(s) Outreach Outcomes & Actions Taken:    Provider Pt Reattribution - Outreach Outcomes & Actions Taken  : Upcoming Appt with Another Provider Already Scheduled

## 2024-03-28 ENCOUNTER — OFFICE VISIT (OUTPATIENT)
Dept: FAMILY MEDICINE | Facility: CLINIC | Age: 39
End: 2024-03-28
Payer: COMMERCIAL

## 2024-03-28 ENCOUNTER — LAB VISIT (OUTPATIENT)
Dept: LAB | Facility: HOSPITAL | Age: 39
End: 2024-03-28
Attending: INTERNAL MEDICINE
Payer: COMMERCIAL

## 2024-03-28 ENCOUNTER — OFFICE VISIT (OUTPATIENT)
Dept: DERMATOLOGY | Facility: CLINIC | Age: 39
End: 2024-03-28
Payer: COMMERCIAL

## 2024-03-28 VITALS
SYSTOLIC BLOOD PRESSURE: 122 MMHG | WEIGHT: 138.69 LBS | BODY MASS INDEX: 23.68 KG/M2 | HEIGHT: 64 IN | HEART RATE: 98 BPM | OXYGEN SATURATION: 100 % | DIASTOLIC BLOOD PRESSURE: 86 MMHG

## 2024-03-28 DIAGNOSIS — D64.9 ANEMIA, UNSPECIFIED TYPE: ICD-10-CM

## 2024-03-28 DIAGNOSIS — D50.9 IRON DEFICIENCY ANEMIA, UNSPECIFIED IRON DEFICIENCY ANEMIA TYPE: ICD-10-CM

## 2024-03-28 DIAGNOSIS — D18.01 CHERRY ANGIOMA: ICD-10-CM

## 2024-03-28 DIAGNOSIS — L82.1 SK (SEBORRHEIC KERATOSIS): ICD-10-CM

## 2024-03-28 DIAGNOSIS — L73.9 FOLLICULITIS: ICD-10-CM

## 2024-03-28 DIAGNOSIS — L73.8 SEBACEOUS HYPERPLASIA OF FACE: ICD-10-CM

## 2024-03-28 DIAGNOSIS — L91.8 SKIN TAG: ICD-10-CM

## 2024-03-28 DIAGNOSIS — L08.9 RECURRENT INFECTION OF SKIN: ICD-10-CM

## 2024-03-28 DIAGNOSIS — Z76.89 ENCOUNTER FOR SKIN CARE: ICD-10-CM

## 2024-03-28 DIAGNOSIS — L85.8 KERATOSIS PILARIS: Primary | ICD-10-CM

## 2024-03-28 DIAGNOSIS — Z76.89 ENCOUNTER TO ESTABLISH CARE WITH NEW DOCTOR: Primary | ICD-10-CM

## 2024-03-28 DIAGNOSIS — L81.9 DISCOLORED SKIN: ICD-10-CM

## 2024-03-28 DIAGNOSIS — L98.9 SKIN LESION: ICD-10-CM

## 2024-03-28 LAB
BASOPHILS # BLD AUTO: 0.01 K/UL (ref 0–0.2)
BASOPHILS NFR BLD: 0.2 % (ref 0–1.9)
DIFFERENTIAL METHOD BLD: ABNORMAL
EOSINOPHIL # BLD AUTO: 0 K/UL (ref 0–0.5)
EOSINOPHIL NFR BLD: 0.7 % (ref 0–8)
ERYTHROCYTE [DISTWIDTH] IN BLOOD BY AUTOMATED COUNT: 14.6 % (ref 11.5–14.5)
FERRITIN SERPL-MCNC: 10 NG/ML (ref 20–300)
FOLATE SERPL-MCNC: 8.9 NG/ML (ref 4–24)
HCT VFR BLD AUTO: 40.5 % (ref 37–48.5)
HGB BLD-MCNC: 12.9 G/DL (ref 12–16)
IMM GRANULOCYTES # BLD AUTO: 0.01 K/UL (ref 0–0.04)
IMM GRANULOCYTES NFR BLD AUTO: 0.2 % (ref 0–0.5)
IRON SERPL-MCNC: 27 UG/DL (ref 30–160)
LYMPHOCYTES # BLD AUTO: 2.3 K/UL (ref 1–4.8)
LYMPHOCYTES NFR BLD: 53.4 % (ref 18–48)
MCH RBC QN AUTO: 26.8 PG (ref 27–31)
MCHC RBC AUTO-ENTMCNC: 31.9 G/DL (ref 32–36)
MCV RBC AUTO: 84 FL (ref 82–98)
MONOCYTES # BLD AUTO: 0.3 K/UL (ref 0.3–1)
MONOCYTES NFR BLD: 8 % (ref 4–15)
NEUTROPHILS # BLD AUTO: 1.6 K/UL (ref 1.8–7.7)
NEUTROPHILS NFR BLD: 37.5 % (ref 38–73)
NRBC BLD-RTO: 0 /100 WBC
PLATELET # BLD AUTO: 238 K/UL (ref 150–450)
PMV BLD AUTO: 12.1 FL (ref 9.2–12.9)
RBC # BLD AUTO: 4.81 M/UL (ref 4–5.4)
SATURATED IRON: 5 % (ref 20–50)
TOTAL IRON BINDING CAPACITY: 534 UG/DL (ref 250–450)
TRANSFERRIN SERPL-MCNC: 361 MG/DL (ref 200–375)
VIT B12 SERPL-MCNC: 405 PG/ML (ref 210–950)
WBC # BLD AUTO: 4.23 K/UL (ref 3.9–12.7)

## 2024-03-28 PROCEDURE — G2211 COMPLEX E/M VISIT ADD ON: HCPCS | Mod: S$GLB,,, | Performed by: DERMATOLOGY

## 2024-03-28 PROCEDURE — 3079F DIAST BP 80-89 MM HG: CPT | Mod: CPTII,S$GLB,, | Performed by: FAMILY MEDICINE

## 2024-03-28 PROCEDURE — 82746 ASSAY OF FOLIC ACID SERUM: CPT | Performed by: INTERNAL MEDICINE

## 2024-03-28 PROCEDURE — 3074F SYST BP LT 130 MM HG: CPT | Mod: CPTII,S$GLB,, | Performed by: FAMILY MEDICINE

## 2024-03-28 PROCEDURE — 99204 OFFICE O/P NEW MOD 45 MIN: CPT | Mod: S$GLB,,, | Performed by: DERMATOLOGY

## 2024-03-28 PROCEDURE — 1160F RVW MEDS BY RX/DR IN RCRD: CPT | Mod: CPTII,S$GLB,, | Performed by: DERMATOLOGY

## 2024-03-28 PROCEDURE — 1159F MED LIST DOCD IN RCRD: CPT | Mod: CPTII,S$GLB,, | Performed by: DERMATOLOGY

## 2024-03-28 PROCEDURE — 99999 PR PBB SHADOW E&M-EST. PATIENT-LVL IV: CPT | Mod: PBBFAC,,, | Performed by: DERMATOLOGY

## 2024-03-28 PROCEDURE — 36415 COLL VENOUS BLD VENIPUNCTURE: CPT | Performed by: INTERNAL MEDICINE

## 2024-03-28 PROCEDURE — 3008F BODY MASS INDEX DOCD: CPT | Mod: CPTII,S$GLB,, | Performed by: FAMILY MEDICINE

## 2024-03-28 PROCEDURE — 85025 COMPLETE CBC W/AUTO DIFF WBC: CPT | Performed by: INTERNAL MEDICINE

## 2024-03-28 PROCEDURE — 99214 OFFICE O/P EST MOD 30 MIN: CPT | Mod: S$GLB,,, | Performed by: FAMILY MEDICINE

## 2024-03-28 PROCEDURE — 82607 VITAMIN B-12: CPT | Performed by: INTERNAL MEDICINE

## 2024-03-28 PROCEDURE — 99999 PR PBB SHADOW E&M-EST. PATIENT-LVL IV: CPT | Mod: PBBFAC,,, | Performed by: FAMILY MEDICINE

## 2024-03-28 PROCEDURE — 83540 ASSAY OF IRON: CPT | Performed by: INTERNAL MEDICINE

## 2024-03-28 PROCEDURE — 82728 ASSAY OF FERRITIN: CPT | Performed by: INTERNAL MEDICINE

## 2024-03-28 RX ORDER — MUPIROCIN 20 MG/G
OINTMENT TOPICAL 2 TIMES DAILY
Qty: 30 G | Refills: 1 | Status: SHIPPED | OUTPATIENT
Start: 2024-03-28

## 2024-03-28 NOTE — PATIENT INSTRUCTIONS
Keratosis Pilaris:    Etiology of keratosis pilaris discussed and explained the dry skin plugging of the pores.  Recommended salicylic acid washes for now with avoidance of excessively hot water bathing on affected areas.  Use a scrub brush and a loofa.  Can consider moisturizers and natural oils later.  Consider gluten free diet as it may help.

## 2024-03-28 NOTE — PROGRESS NOTES
Subjective:      Patient ID:  Deirdre Prado is a 39 y.o. female who presents for   Chief Complaint   Patient presents with    Skin Discoloration     Diffuse      Skin Discoloration - Initial  Affected locations: Started on bilateral cheek (started after pregnancy in 2014)  Duration: 10 years  Signs / symptoms: spreading and redness (rought in texture)  Severity: mild  Aggravated by: stress  Treatments tried: hydroquinone 4 % Cream.        Review of Systems   Constitutional:  Negative for fever.   HENT:  Negative for sore throat.    Respiratory:  Negative for cough.    Skin:  Positive for rash, activity-related sunscreen use and wears hat. Negative for itching, daily sunscreen use and recent sunburn.   Hematologic/Lymphatic: Does not bruise/bleed easily.       Objective:   Physical Exam   Constitutional: She appears well-developed and well-nourished. No distress.   Eyes: No conjunctival no injection.   Neurological: She is alert and oriented to person, place, and time. She is not disoriented.   Psychiatric: She has a normal mood and affect.   Skin:   Areas Examined (abnormalities noted in diagram):   Head / Face Inspection Performed  Neck Inspection Performed  Chest / Axilla Inspection Performed  Back Inspection Performed  RUE Inspected  LUE Inspection Performed                     Diagram Legend     Erythematous scaling macule/papule c/w actinic keratosis       Vascular papule c/w angioma      Pigmented verrucoid papule/plaque c/w seborrheic keratosis      Yellow umbilicated papule c/w sebaceous hyperplasia      Irregularly shaped tan macule c/w lentigo     1-2 mm smooth white papules consistent with Milia      Movable subcutaneous cyst with punctum c/w epidermal inclusion cyst      Subcutaneous movable cyst c/w pilar cyst      Firm pink to brown papule c/w dermatofibroma      Pedunculated fleshy papule(s) c/w skin tag(s)      Evenly pigmented macule c/w junctional nevus     Mildly variegated pigmented,  slightly irregular-bordered macule c/w mildly atypical nevus      Flesh colored to evenly pigmented papule c/w intradermal nevus       Pink pearly papule/plaque c/w basal cell carcinoma      Erythematous hyperkeratotic cursted plaque c/w SCC      Surgical scar with no sign of skin cancer recurrence      Open and closed comedones      Inflammatory papules and pustules      Verrucoid papule consistent consistent with wart     Erythematous eczematous patches and plaques     Dystrophic onycholytic nail with subungual debris c/w onychomycosis     Umbilicated papule    Erythematous-base heme-crusted tan verrucoid plaque consistent with inflamed seborrheic keratosis     Erythematous Silvery Scaling Plaque c/w Psoriasis     See annotation            Assessment / Plan:        Keratosis pilaris  Etiology of keratosis pilaris discussed and explained the dry skin plugging of the pores.  Recommended salicylic acid washes for now with avoidance of excessively hot water bathing on affected areas.  Use a scrub brush and a loofa.  Can consider moisturizers and natural oils later.  Consider gluten free diet as it may help.    SK (seborrheic keratosis)  Discussed with patient the benign nature of these lesions and that no treatment is indicated.  Chronic nature of this condition discussed with patient.  Too small for cos hyf due to scar risk.  Independent historian was in exam room or on virtual today to provide information and assist in delivering therapy and treatment at home.    Discolored skin  -     Ambulatory referral/consult to Dermatology  None noted today.  Independent historian was in exam room or on virtual today to provide information and assist in delivering therapy and treatment at home.    Encounter for skin care  No hot water bathing reviewed.    Sebaceous hyperplasia of face  Discussed with patient the benign nature of these lesions and that no treatment is indicated.  Prn cosmetic hyfrecation of seb hyper and tag on  the l temple and r eyelid.  Costs $200.  Scar and recurrence reviewed.    Cherry angioma  Discussed with patient the benign nature of these lesions and that no treatment is indicated.  Chronic nature of this condition discussed with patient.    Skin tag  Discussed with patient the benign nature of these lesions and that no treatment is indicated.  Cos hyf as above.             Follow up if symptoms worsen or fail to improve, for Procedure.

## 2024-03-28 NOTE — PROGRESS NOTES
(Portions of this note were dictated using voice recognition software and may contain dictation related errors in spelling/grammar/syntax not found on text review)    CC:   Chief Complaint   Patient presents with    [facility]     Pt is fasting     Hand Pain     Right hand     Rash     In face red dots.       HPI: 39 y.o. female presented to [facility] as a new patient accompanied with her .  She is medical history significant for depression, migraines, iron-deficiency anemia.    Patient has concerns about multiple skin issues.  She has multiple skin tags and cherry angiomas on the neck in the chest area, has scheduled appointment with Dermatology this afternoon.      Her other concern is having recurrent abscesses and skin infections in the groin area, states that most of the time these abscesses open up on their own with the release of purulent material and it was not treated medically.  She also reports stopping the shaving of the groin region due to the same issues but it does not make any difference.    She takes ferrous sulfate 65 mg daily, reports adherence with medication, due for anemia labs checked.      She also takes vitamin D3 on regular basis     She does not smoke, has no toxic habits.    She denies having any other symptoms or concerns.  Past Medical History:   Diagnosis Date    Breast disorder     Depression     Mental disorder     Migraine        Past Surgical History:   Procedure Laterality Date    TUBAL LIGATION         Family History   Problem Relation Age of Onset    Cancer Mother     Breast cancer Mother     Ovarian cancer Mother     No Known Problems Father     Breast cancer Maternal Grandmother     Lupus Neg Hx     Rheum arthritis Neg Hx     Inflammatory bowel disease Neg Hx        Social History     Tobacco Use    Smoking status: Never    Smokeless tobacco: Never   Substance Use Topics    Alcohol use: Yes    Drug use: No       Lab Results   Component Value Date    WBC 4.23  03/28/2024    HGB 12.9 03/28/2024    HCT 40.5 03/28/2024    MCV 84 03/28/2024     03/28/2024    CHOL 185 11/11/2023    TRIG 89 11/11/2023    HDL 41 11/11/2023    ALT 12 11/11/2023    AST 15 11/11/2023    BILITOT <0.1 (A) 11/11/2023    ALKPHOS 46 (L) 11/11/2023     11/11/2023    K 4.3 11/11/2023     11/11/2023    CREATININE 0.7 11/11/2023    ESTGFRAFRICA >60.0 12/16/2020    EGFRNONAA >60.0 12/16/2020    CALCIUM 8.6 (L) 11/11/2023    ALBUMIN 3.7 11/11/2023    BUN 14 11/11/2023    CO2 22 (L) 11/11/2023    TSH 0.974 11/11/2023    HGBA1C 5.5 11/11/2023    LDLCALC 126.2 11/11/2023    GLU 93 11/11/2023    [id number] 19 (L) 10/31/2020             Vital signs reviewed  PE:   APPEARANCE: Well nourished, well developed, in no acute distress.    HEAD: Normocephalic, atraumatic.  EYES: EOMI.  Conjunctivae noninjected.  NOSE: Mucosa pink. Airway clear.  MOUTH & THROAT: No tonsillar enlargement. No pharyngeal erythema or exudate.   NECK: Supple with no cervical lymphadenopathy.  Multiple keratotic lesions on the neck, skin tag on the right eyelid  CHEST: Good inspiratory effort. Lungs clear to auscultation with no wheezes or crackles.  CARDIOVASCULAR: Normal S1, S2. No rubs, murmurs, or gallops.  ABDOMEN: Bowel sounds normal. Not distended. Soft. No tenderness or masses. No organomegaly.  EXTREMITIES: No edema, cyanosis, or clubbing.    Review of Systems   Constitutional:  Negative for chills, fatigue and fever.   HENT: Negative.     Respiratory: Negative.     Cardiovascular: Negative.    Gastrointestinal: Negative.    Genitourinary: Negative.    Neurological: Negative.    Psychiatric/Behavioral: Negative.     All other systems reviewed and are negative.      IMPRESSION  1. Encounter to establish care with new doctor    2. Folliculitis    3. Recurrent infection of skin    4. Iron deficiency anemia, unspecified iron deficiency anemia type            PLAN      1. Folliculitis    - mupirocin (BACTROBAN) 2 %  ointment; Apply topically 2 (two) times daily.  Dispense: 30 g; Refill: 1      2. Encounter to establish care with new doctor      3. Recurrent infection of skin    Advised to avoid shaving     General recommendation for hygiene and comfortable clotting given    Bactroban given for recurrent issues, recommended evaluation by Dermatology for hidradenitis suppurative in case of recurrence      4. Iron deficiency anemia, unspecified iron deficiency anemia type     Continue iron sulfate    Anemia labs today      SCREENINGS      Immunizations:     Had 2 doses of COVID vaccine  Up-to-date with flu and tetanus vaccine      Age/demographic appropriate health maintenance:    Up-to-date with mammogram      Health Maintenance Due   Topic Date Due    COVID-19 Vaccine (4 - 2023-24 season) 09/01/2023    Cervical Cancer Screening  04/07/2024           Spent adequate time in obtaining history and explaining differentials       30 minutes spent during this visit of which greater than 50% devoted to face-face counseling and coordination of care regarding diagnosis and management plan       Kristie Rich   3/28/2024

## 2024-08-09 ENCOUNTER — OFFICE VISIT (OUTPATIENT)
Dept: GASTROENTEROLOGY | Facility: CLINIC | Age: 39
End: 2024-08-09
Payer: COMMERCIAL

## 2024-08-09 VITALS
BODY MASS INDEX: 24.3 KG/M2 | DIASTOLIC BLOOD PRESSURE: 90 MMHG | WEIGHT: 142.31 LBS | HEART RATE: 75 BPM | HEIGHT: 64 IN | SYSTOLIC BLOOD PRESSURE: 138 MMHG

## 2024-08-09 DIAGNOSIS — K59.09 CHRONIC CONSTIPATION: Primary | ICD-10-CM

## 2024-08-09 PROBLEM — M94.0 COSTOCHONDRITIS: Status: RESOLVED | Noted: 2020-12-08 | Resolved: 2024-08-09

## 2024-08-09 PROCEDURE — 99999 PR PBB SHADOW E&M-EST. PATIENT-LVL IV: CPT | Mod: PBBFAC,,, | Performed by: NURSE PRACTITIONER

## 2024-08-22 ENCOUNTER — TELEPHONE (OUTPATIENT)
Dept: GASTROENTEROLOGY | Facility: CLINIC | Age: 39
End: 2024-08-22
Payer: COMMERCIAL

## 2024-08-22 NOTE — TELEPHONE ENCOUNTER
Attempted to contact patient, no answer, left VM.    ----- Message from Gladis Duval sent at 8/22/2024  4:34 PM CDT -----  Type: Patient Call Back    Who called:pt     What is the request in detail:pt calling to discuss medications not working for her and would like to discuss with the nurse. Call pt     Can the clinic reply by [facility]?    Would the patient rather a call back or a response via [facility]? call    Best call back number:[phone] (home)       Additional Information:

## 2024-09-18 ENCOUNTER — TELEPHONE (OUTPATIENT)
Dept: GASTROENTEROLOGY | Facility: CLINIC | Age: 39
End: 2024-09-18
Payer: COMMERCIAL

## 2024-09-18 DIAGNOSIS — K59.09 CHRONIC CONSTIPATION: Primary | ICD-10-CM

## 2024-09-18 NOTE — TELEPHONE ENCOUNTER
Returned patient call, she stated that the Linzess is no longer working for her. She stated that she did complete the MOM/Dulcolax cleanse exactly as she was supposed to, then afterwards, she started the Linzess. Patient states that she still goes 2 -3 day without having a bowel movement. Patient stated that her rx for Linzess is ready to be picked up at the pharmacy but she doesn't know whether or not she should pick it up because it is not working.     ----- Message from Claire Grullon sent at 9/18/2024  1:13 PM CDT -----  Type:  Needs Medical Advice    Who Called: Deirdre Prado    Would the patient rather a call back or a response via [facility]?  call  Best Call Back Number:   [phone]  Additional Information:  Pt states that medication is not being effective and wants to know what next steps to take.       linaCLOtide (LINZESS) 145 mcg Cap capsule [[id number]

## 2024-09-20 NOTE — TELEPHONE ENCOUNTER
Patient informed to stop the Linzess 145 mcg and that a new rx for Linzess 290 mcg was sent to her pharmacy.

## 2024-11-25 ENCOUNTER — HOSPITAL ENCOUNTER (OUTPATIENT)
Dept: RADIOLOGY | Facility: HOSPITAL | Age: 39
Discharge: HOME | End: 2024-11-25
Attending: FAMILY MEDICINE
Payer: COMMERCIAL

## 2024-11-25 VITALS — HEIGHT: 64 IN | WEIGHT: 142 LBS | BODY MASS INDEX: 24.24 KG/M2

## 2024-11-25 DIAGNOSIS — Z12.31 ENCOUNTER FOR SCREENING MAMMOGRAM FOR BREAST CANCER: ICD-10-CM

## 2024-11-25 PROCEDURE — 77067 SCR MAMMO BI INCL CAD: CPT | Mod: 26,,, | Performed by: RADIOLOGY

## 2024-11-25 PROCEDURE — 77067 SCR MAMMO BI INCL CAD: CPT | Mod: TC

## 2024-11-25 PROCEDURE — 77063 BREAST TOMOSYNTHESIS BI: CPT | Mod: 26,,, | Performed by: RADIOLOGY

## 2025-03-30 DIAGNOSIS — K59.09 CHRONIC CONSTIPATION: ICD-10-CM
